# Patient Record
Sex: FEMALE | Race: OTHER | HISPANIC OR LATINO | URBAN - METROPOLITAN AREA
[De-identification: names, ages, dates, MRNs, and addresses within clinical notes are randomized per-mention and may not be internally consistent; named-entity substitution may affect disease eponyms.]

---

## 2021-04-13 ENCOUNTER — INPATIENT (INPATIENT)
Facility: HOSPITAL | Age: 66
LOS: 5 days | Discharge: HOME | DRG: 871 | End: 2021-04-19
Attending: INTERNAL MEDICINE | Admitting: INTERNAL MEDICINE
Payer: COMMERCIAL

## 2021-04-13 VITALS
SYSTOLIC BLOOD PRESSURE: 153 MMHG | TEMPERATURE: 100 F | DIASTOLIC BLOOD PRESSURE: 78 MMHG | OXYGEN SATURATION: 97 % | HEART RATE: 140 BPM | HEIGHT: 69 IN | RESPIRATION RATE: 22 BRPM | WEIGHT: 293 LBS

## 2021-04-13 DIAGNOSIS — Z90.49 ACQUIRED ABSENCE OF OTHER SPECIFIED PARTS OF DIGESTIVE TRACT: Chronic | ICD-10-CM

## 2021-04-13 LAB
ALBUMIN SERPL ELPH-MCNC: 2.6 G/DL — LOW (ref 3.5–5)
ALP SERPL-CCNC: 163 U/L — HIGH (ref 40–120)
ALT FLD-CCNC: 20 U/L DA — SIGNIFICANT CHANGE UP (ref 10–60)
ANION GAP SERPL CALC-SCNC: 10 MMOL/L — SIGNIFICANT CHANGE UP (ref 5–17)
AST SERPL-CCNC: 34 U/L — SIGNIFICANT CHANGE UP (ref 10–40)
BASOPHILS # BLD AUTO: 0 K/UL — SIGNIFICANT CHANGE UP (ref 0–0.2)
BASOPHILS NFR BLD AUTO: 0 % — SIGNIFICANT CHANGE UP (ref 0–2)
BILIRUB SERPL-MCNC: 0.9 MG/DL — SIGNIFICANT CHANGE UP (ref 0.2–1.2)
BUN SERPL-MCNC: 33 MG/DL — HIGH (ref 7–18)
CALCIUM SERPL-MCNC: 8.4 MG/DL — SIGNIFICANT CHANGE UP (ref 8.4–10.5)
CHLORIDE SERPL-SCNC: 102 MMOL/L — SIGNIFICANT CHANGE UP (ref 96–108)
CO2 SERPL-SCNC: 24 MMOL/L — SIGNIFICANT CHANGE UP (ref 22–31)
CREAT SERPL-MCNC: 2.7 MG/DL — HIGH (ref 0.5–1.3)
D DIMER BLD IA.RAPID-MCNC: 5605 NG/ML DDU — HIGH
EOSINOPHIL # BLD AUTO: 0 K/UL — SIGNIFICANT CHANGE UP (ref 0–0.5)
EOSINOPHIL NFR BLD AUTO: 0 % — SIGNIFICANT CHANGE UP (ref 0–6)
GLUCOSE SERPL-MCNC: 152 MG/DL — HIGH (ref 70–99)
HCT VFR BLD CALC: 37 % — SIGNIFICANT CHANGE UP (ref 34.5–45)
HGB BLD-MCNC: 11.8 G/DL — SIGNIFICANT CHANGE UP (ref 11.5–15.5)
HIV 1 & 2 AB SERPL IA.RAPID: SIGNIFICANT CHANGE UP
LACTATE SERPL-SCNC: 2.5 MMOL/L — HIGH (ref 0.7–2)
LYMPHOCYTES # BLD AUTO: 0.58 K/UL — LOW (ref 1–3.3)
LYMPHOCYTES # BLD AUTO: 5 % — LOW (ref 13–44)
MCHC RBC-ENTMCNC: 29.1 PG — SIGNIFICANT CHANGE UP (ref 27–34)
MCHC RBC-ENTMCNC: 31.9 GM/DL — LOW (ref 32–36)
MCV RBC AUTO: 91.4 FL — SIGNIFICANT CHANGE UP (ref 80–100)
MONOCYTES # BLD AUTO: 0.12 K/UL — SIGNIFICANT CHANGE UP (ref 0–0.9)
MONOCYTES NFR BLD AUTO: 1 % — LOW (ref 2–14)
NEUTROPHILS # BLD AUTO: 10.75 K/UL — HIGH (ref 1.8–7.4)
NEUTROPHILS NFR BLD AUTO: 79 % — HIGH (ref 43–77)
NT-PROBNP SERPL-SCNC: 576 PG/ML — HIGH (ref 0–125)
PLATELET # BLD AUTO: 196 K/UL — SIGNIFICANT CHANGE UP (ref 150–400)
POTASSIUM SERPL-MCNC: 3.2 MMOL/L — LOW (ref 3.5–5.3)
POTASSIUM SERPL-SCNC: 3.2 MMOL/L — LOW (ref 3.5–5.3)
PROT SERPL-MCNC: 7.5 G/DL — SIGNIFICANT CHANGE UP (ref 6–8.3)
RBC # BLD: 4.05 M/UL — SIGNIFICANT CHANGE UP (ref 3.8–5.2)
RBC # FLD: 14.6 % — HIGH (ref 10.3–14.5)
SODIUM SERPL-SCNC: 136 MMOL/L — SIGNIFICANT CHANGE UP (ref 135–145)
TROPONIN I SERPL-MCNC: 1.97 NG/ML — HIGH (ref 0–0.04)
WBC # BLD: 11.56 K/UL — HIGH (ref 3.8–10.5)
WBC # FLD AUTO: 11.56 K/UL — HIGH (ref 3.8–10.5)

## 2021-04-13 PROCEDURE — 71045 X-RAY EXAM CHEST 1 VIEW: CPT | Mod: 26

## 2021-04-13 PROCEDURE — 99291 CRITICAL CARE FIRST HOUR: CPT

## 2021-04-13 RX ORDER — ACETAMINOPHEN 500 MG
650 TABLET ORAL ONCE
Refills: 0 | Status: COMPLETED | OUTPATIENT
Start: 2021-04-13 | End: 2021-04-13

## 2021-04-13 RX ORDER — SODIUM CHLORIDE 9 MG/ML
1000 INJECTION INTRAMUSCULAR; INTRAVENOUS; SUBCUTANEOUS ONCE
Refills: 0 | Status: COMPLETED | OUTPATIENT
Start: 2021-04-13 | End: 2021-04-13

## 2021-04-13 RX ORDER — CEFTRIAXONE 500 MG/1
1000 INJECTION, POWDER, FOR SOLUTION INTRAMUSCULAR; INTRAVENOUS ONCE
Refills: 0 | Status: COMPLETED | OUTPATIENT
Start: 2021-04-13 | End: 2021-04-13

## 2021-04-13 RX ADMIN — CEFTRIAXONE 100 MILLIGRAM(S): 500 INJECTION, POWDER, FOR SOLUTION INTRAMUSCULAR; INTRAVENOUS at 23:22

## 2021-04-13 RX ADMIN — Medication 650 MILLIGRAM(S): at 23:22

## 2021-04-13 RX ADMIN — SODIUM CHLORIDE 3000 MILLILITER(S): 9 INJECTION INTRAMUSCULAR; INTRAVENOUS; SUBCUTANEOUS at 23:22

## 2021-04-13 RX ADMIN — SODIUM CHLORIDE 2100 MILLILITER(S): 9 INJECTION INTRAMUSCULAR; INTRAVENOUS; SUBCUTANEOUS at 23:05

## 2021-04-13 NOTE — ED PROVIDER NOTE - CARE PLAN
Principal Discharge DX:	Renal failure  Secondary Diagnosis:	Tachycardia  Secondary Diagnosis:	Troponin level elevated  Secondary Diagnosis:	D-dimer, elevated

## 2021-04-13 NOTE — ED PROVIDER NOTE - OBJECTIVE STATEMENT
Chief complaint of chills x 4 days. On evaluation pt denies chest pain, shortness of breath, no reported orthopnea, denies fever, no vomiting, no abdominal pain.  Triage reported pox 91% on RA, Ox 97% on 4L NC.  Pt with temp of 99.9, , code sepsis called at 1030pm. blood cultures, lactate and early antibiotics ordered.   30cc/kg bolus held due to possible COVID viral cause of sepsis.

## 2021-04-13 NOTE — ED PROVIDER NOTE - CLINICAL SUMMARY MEDICAL DECISION MAKING FREE TEXT BOX
Pt with elevated d-dimer , will heparinize for possible PE, unable to do CTA at this time due to renal failure.  MAR and Dr. Pike endorsed pt admitted to telemetry for further monitoring.   Bandemia noted will administer Vancomycin.  Pt agrees with admission.  I had a detailed discussion with the patient and/or guardian regarding the historical points, exam findings, and any diagnostic results supporting the admit diagnosis.

## 2021-04-14 DIAGNOSIS — R09.89 OTHER SPECIFIED SYMPTOMS AND SIGNS INVOLVING THE CIRCULATORY AND RESPIRATORY SYSTEMS: ICD-10-CM

## 2021-04-14 DIAGNOSIS — E87.6 HYPOKALEMIA: ICD-10-CM

## 2021-04-14 DIAGNOSIS — R77.8 OTHER SPECIFIED ABNORMALITIES OF PLASMA PROTEINS: ICD-10-CM

## 2021-04-14 DIAGNOSIS — I10 ESSENTIAL (PRIMARY) HYPERTENSION: ICD-10-CM

## 2021-04-14 DIAGNOSIS — N39.0 URINARY TRACT INFECTION, SITE NOT SPECIFIED: ICD-10-CM

## 2021-04-14 DIAGNOSIS — A41.9 SEPSIS, UNSPECIFIED ORGANISM: ICD-10-CM

## 2021-04-14 DIAGNOSIS — R74.8 ABNORMAL LEVELS OF OTHER SERUM ENZYMES: ICD-10-CM

## 2021-04-14 DIAGNOSIS — N19 UNSPECIFIED KIDNEY FAILURE: ICD-10-CM

## 2021-04-14 DIAGNOSIS — N17.9 ACUTE KIDNEY FAILURE, UNSPECIFIED: ICD-10-CM

## 2021-04-14 DIAGNOSIS — Z29.9 ENCOUNTER FOR PROPHYLACTIC MEASURES, UNSPECIFIED: ICD-10-CM

## 2021-04-14 LAB
24R-OH-CALCIDIOL SERPL-MCNC: 27.6 NG/ML — LOW (ref 30–80)
A1C WITH ESTIMATED AVERAGE GLUCOSE RESULT: 6.5 % — HIGH (ref 4–5.6)
ALBUMIN SERPL ELPH-MCNC: 2.5 G/DL — LOW (ref 3.5–5)
ALP SERPL-CCNC: 119 U/L — SIGNIFICANT CHANGE UP (ref 40–120)
ALT FLD-CCNC: 23 U/L DA — SIGNIFICANT CHANGE UP (ref 10–60)
ANION GAP SERPL CALC-SCNC: 9 MMOL/L — SIGNIFICANT CHANGE UP (ref 5–17)
APPEARANCE UR: ABNORMAL
APTT BLD: 76.6 SEC — HIGH (ref 27.5–35.5)
APTT BLD: 76.6 SEC — HIGH (ref 27.5–35.5)
APTT BLD: 85.2 SEC — HIGH (ref 27.5–35.5)
AST SERPL-CCNC: 49 U/L — HIGH (ref 10–40)
BASOPHILS # BLD AUTO: 0.1 K/UL — SIGNIFICANT CHANGE UP (ref 0–0.2)
BASOPHILS NFR BLD AUTO: 0.4 % — SIGNIFICANT CHANGE UP (ref 0–2)
BILIRUB SERPL-MCNC: 0.8 MG/DL — SIGNIFICANT CHANGE UP (ref 0.2–1.2)
BILIRUB UR-MCNC: NEGATIVE — SIGNIFICANT CHANGE UP
BUN SERPL-MCNC: 33 MG/DL — HIGH (ref 7–18)
CALCIUM SERPL-MCNC: 8.3 MG/DL — LOW (ref 8.4–10.5)
CHLORIDE SERPL-SCNC: 103 MMOL/L — SIGNIFICANT CHANGE UP (ref 96–108)
CHOLEST SERPL-MCNC: 152 MG/DL — SIGNIFICANT CHANGE UP
CO2 SERPL-SCNC: 25 MMOL/L — SIGNIFICANT CHANGE UP (ref 22–31)
COLOR SPEC: ABNORMAL
CREAT ?TM UR-MCNC: 301 MG/DL — SIGNIFICANT CHANGE UP
CREAT SERPL-MCNC: 2.83 MG/DL — HIGH (ref 0.5–1.3)
CRP SERPL-MCNC: 233 MG/L — HIGH
DIFF PNL FLD: ABNORMAL
EOSINOPHIL # BLD AUTO: 0.13 K/UL — SIGNIFICANT CHANGE UP (ref 0–0.5)
EOSINOPHIL NFR BLD AUTO: 0.5 % — SIGNIFICANT CHANGE UP (ref 0–6)
ERYTHROCYTE [SEDIMENTATION RATE] IN BLOOD: 70 MM/HR — HIGH (ref 0–20)
ESTIMATED AVERAGE GLUCOSE: 140 MG/DL — HIGH (ref 68–114)
FERRITIN SERPL-MCNC: 196 NG/ML — HIGH (ref 15–150)
FERRITIN SERPL-MCNC: 246 NG/ML — HIGH (ref 15–150)
GGT SERPL-CCNC: 106 U/L — HIGH (ref 8–40)
GLUCOSE SERPL-MCNC: 199 MG/DL — HIGH (ref 70–99)
GLUCOSE UR QL: NEGATIVE — SIGNIFICANT CHANGE UP
GRAM STN FLD: SIGNIFICANT CHANGE UP
HCT VFR BLD CALC: 35.3 % — SIGNIFICANT CHANGE UP (ref 34.5–45)
HCT VFR BLD CALC: 35.8 % — SIGNIFICANT CHANGE UP (ref 34.5–45)
HCT VFR BLD CALC: 36.4 % — SIGNIFICANT CHANGE UP (ref 34.5–45)
HDLC SERPL-MCNC: 48 MG/DL — LOW
HGB BLD-MCNC: 11.1 G/DL — LOW (ref 11.5–15.5)
HGB BLD-MCNC: 11.4 G/DL — LOW (ref 11.5–15.5)
HGB BLD-MCNC: 11.5 G/DL — SIGNIFICANT CHANGE UP (ref 11.5–15.5)
IMM GRANULOCYTES NFR BLD AUTO: 2.3 % — HIGH (ref 0–1.5)
INR BLD: 1.27 RATIO — HIGH (ref 0.88–1.16)
KETONES UR-MCNC: ABNORMAL
LACTATE SERPL-SCNC: 2.2 MMOL/L — HIGH (ref 0.7–2)
LDH SERPL L TO P-CCNC: 213 U/L — SIGNIFICANT CHANGE UP (ref 120–225)
LEUKOCYTE ESTERASE UR-ACNC: ABNORMAL
LIPID PNL WITH DIRECT LDL SERPL: 81 MG/DL — SIGNIFICANT CHANGE UP
LYMPHOCYTES # BLD AUTO: 0.65 K/UL — LOW (ref 1–3.3)
LYMPHOCYTES # BLD AUTO: 2.4 % — LOW (ref 13–44)
MAGNESIUM SERPL-MCNC: 1.8 MG/DL — SIGNIFICANT CHANGE UP (ref 1.6–2.6)
MCHC RBC-ENTMCNC: 29.1 PG — SIGNIFICANT CHANGE UP (ref 27–34)
MCHC RBC-ENTMCNC: 29.4 PG — SIGNIFICANT CHANGE UP (ref 27–34)
MCHC RBC-ENTMCNC: 29.8 PG — SIGNIFICANT CHANGE UP (ref 27–34)
MCHC RBC-ENTMCNC: 31.3 GM/DL — LOW (ref 32–36)
MCHC RBC-ENTMCNC: 31.4 GM/DL — LOW (ref 32–36)
MCHC RBC-ENTMCNC: 32.1 GM/DL — SIGNIFICANT CHANGE UP (ref 32–36)
MCV RBC AUTO: 92.4 FL — SIGNIFICANT CHANGE UP (ref 80–100)
MCV RBC AUTO: 92.7 FL — SIGNIFICANT CHANGE UP (ref 80–100)
MCV RBC AUTO: 93.8 FL — SIGNIFICANT CHANGE UP (ref 80–100)
METHOD TYPE: SIGNIFICANT CHANGE UP
MONOCYTES # BLD AUTO: 1.13 K/UL — HIGH (ref 0–0.9)
MONOCYTES NFR BLD AUTO: 4.2 % — SIGNIFICANT CHANGE UP (ref 2–14)
NEUTROPHILS # BLD AUTO: 24.08 K/UL — HIGH (ref 1.8–7.4)
NEUTROPHILS NFR BLD AUTO: 90.2 % — HIGH (ref 43–77)
NITRITE UR-MCNC: POSITIVE
NON HDL CHOLESTEROL: 104 MG/DL — SIGNIFICANT CHANGE UP
NRBC # BLD: 0 /100 WBCS — SIGNIFICANT CHANGE UP (ref 0–0)
OSMOLALITY UR: 481 MOS/KG — SIGNIFICANT CHANGE UP (ref 50–1200)
P MIRABILIS DNA BLD POS QL NAA+PROBE: SIGNIFICANT CHANGE UP
PH UR: 9 — HIGH (ref 5–8)
PHOSPHATE SERPL-MCNC: 3.5 MG/DL — SIGNIFICANT CHANGE UP (ref 2.5–4.5)
PLATELET # BLD AUTO: 182 K/UL — SIGNIFICANT CHANGE UP (ref 150–400)
PLATELET # BLD AUTO: 193 K/UL — SIGNIFICANT CHANGE UP (ref 150–400)
PLATELET # BLD AUTO: 201 K/UL — SIGNIFICANT CHANGE UP (ref 150–400)
POTASSIUM SERPL-MCNC: 3.7 MMOL/L — SIGNIFICANT CHANGE UP (ref 3.5–5.3)
POTASSIUM SERPL-SCNC: 3.7 MMOL/L — SIGNIFICANT CHANGE UP (ref 3.5–5.3)
PROCALCITONIN SERPL-MCNC: 29.3 NG/ML — HIGH (ref 0.02–0.1)
PROT ?TM UR-MCNC: 598 MG/DL — HIGH (ref 0–12)
PROT SERPL-MCNC: 7.5 G/DL — SIGNIFICANT CHANGE UP (ref 6–8.3)
PROT UR-MCNC: 500 MG/DL
PROTHROM AB SERPL-ACNC: 14.9 SEC — HIGH (ref 10.6–13.6)
RAPID RVP RESULT: SIGNIFICANT CHANGE UP
RBC # BLD: 3.82 M/UL — SIGNIFICANT CHANGE UP (ref 3.8–5.2)
RBC # BLD: 3.86 M/UL — SIGNIFICANT CHANGE UP (ref 3.8–5.2)
RBC # BLD: 3.88 M/UL — SIGNIFICANT CHANGE UP (ref 3.8–5.2)
RBC # FLD: 14.7 % — HIGH (ref 10.3–14.5)
RBC # FLD: 14.7 % — HIGH (ref 10.3–14.5)
RBC # FLD: 14.9 % — HIGH (ref 10.3–14.5)
SARS-COV-2 RNA SPEC QL NAA+PROBE: SIGNIFICANT CHANGE UP
SARS-COV-2 RNA SPEC QL NAA+PROBE: SIGNIFICANT CHANGE UP
SODIUM SERPL-SCNC: 137 MMOL/L — SIGNIFICANT CHANGE UP (ref 135–145)
SODIUM UR-SCNC: 35 MMOL/L — SIGNIFICANT CHANGE UP
SP GR SPEC: 1.01 — SIGNIFICANT CHANGE UP (ref 1.01–1.02)
SPECIMEN SOURCE: SIGNIFICANT CHANGE UP
SPECIMEN SOURCE: SIGNIFICANT CHANGE UP
TRIGL SERPL-MCNC: 114 MG/DL — SIGNIFICANT CHANGE UP
TROPONIN I SERPL-MCNC: 3.13 NG/ML — HIGH (ref 0–0.04)
TROPONIN I SERPL-MCNC: 3.19 NG/ML — HIGH (ref 0–0.04)
TROPONIN I SERPL-MCNC: 4.3 NG/ML — HIGH (ref 0–0.04)
TSH SERPL-MCNC: 0.96 UU/ML — SIGNIFICANT CHANGE UP (ref 0.34–4.82)
UROBILINOGEN FLD QL: 1
VIT B12 SERPL-MCNC: 339 PG/ML — SIGNIFICANT CHANGE UP (ref 232–1245)
WBC # BLD: 17.41 K/UL — HIGH (ref 3.8–10.5)
WBC # BLD: 23.08 K/UL — HIGH (ref 3.8–10.5)
WBC # BLD: 26.71 K/UL — HIGH (ref 3.8–10.5)
WBC # FLD AUTO: 17.41 K/UL — HIGH (ref 3.8–10.5)
WBC # FLD AUTO: 23.08 K/UL — HIGH (ref 3.8–10.5)
WBC # FLD AUTO: 26.71 K/UL — HIGH (ref 3.8–10.5)

## 2021-04-14 PROCEDURE — 99223 1ST HOSP IP/OBS HIGH 75: CPT

## 2021-04-14 RX ORDER — CEFTRIAXONE 500 MG/1
1000 INJECTION, POWDER, FOR SOLUTION INTRAMUSCULAR; INTRAVENOUS EVERY 24 HOURS
Refills: 0 | Status: DISCONTINUED | OUTPATIENT
Start: 2021-04-14 | End: 2021-04-19

## 2021-04-14 RX ORDER — VANCOMYCIN HCL 1 G
1000 VIAL (EA) INTRAVENOUS ONCE
Refills: 0 | Status: COMPLETED | OUTPATIENT
Start: 2021-04-14 | End: 2021-04-14

## 2021-04-14 RX ORDER — HEPARIN SODIUM 5000 [USP'U]/ML
5000 INJECTION INTRAVENOUS; SUBCUTANEOUS EVERY 6 HOURS
Refills: 0 | Status: DISCONTINUED | OUTPATIENT
Start: 2021-04-14 | End: 2021-04-18

## 2021-04-14 RX ORDER — HEPARIN SODIUM 5000 [USP'U]/ML
10000 INJECTION INTRAVENOUS; SUBCUTANEOUS EVERY 6 HOURS
Refills: 0 | Status: DISCONTINUED | OUTPATIENT
Start: 2021-04-14 | End: 2021-04-18

## 2021-04-14 RX ORDER — SODIUM CHLORIDE 9 MG/ML
2100 INJECTION INTRAMUSCULAR; INTRAVENOUS; SUBCUTANEOUS ONCE
Refills: 0 | Status: COMPLETED | OUTPATIENT
Start: 2021-04-14 | End: 2021-04-13

## 2021-04-14 RX ORDER — ACETAMINOPHEN 500 MG
975 TABLET ORAL ONCE
Refills: 0 | Status: COMPLETED | OUTPATIENT
Start: 2021-04-14 | End: 2021-04-14

## 2021-04-14 RX ORDER — HEPARIN SODIUM 5000 [USP'U]/ML
10000 INJECTION INTRAVENOUS; SUBCUTANEOUS ONCE
Refills: 0 | Status: COMPLETED | OUTPATIENT
Start: 2021-04-14 | End: 2021-04-14

## 2021-04-14 RX ORDER — SODIUM CHLORIDE 9 MG/ML
1000 INJECTION INTRAMUSCULAR; INTRAVENOUS; SUBCUTANEOUS ONCE
Refills: 0 | Status: COMPLETED | OUTPATIENT
Start: 2021-04-14 | End: 2021-04-14

## 2021-04-14 RX ORDER — SODIUM CHLORIDE 9 MG/ML
1000 INJECTION, SOLUTION INTRAVENOUS
Refills: 0 | Status: DISCONTINUED | OUTPATIENT
Start: 2021-04-14 | End: 2021-04-19

## 2021-04-14 RX ORDER — POTASSIUM CHLORIDE 20 MEQ
40 PACKET (EA) ORAL ONCE
Refills: 0 | Status: COMPLETED | OUTPATIENT
Start: 2021-04-14 | End: 2021-04-14

## 2021-04-14 RX ORDER — HEPARIN SODIUM 5000 [USP'U]/ML
INJECTION INTRAVENOUS; SUBCUTANEOUS
Qty: 25000 | Refills: 0 | Status: DISCONTINUED | OUTPATIENT
Start: 2021-04-14 | End: 2021-04-18

## 2021-04-14 RX ADMIN — Medication 650 MILLIGRAM(S): at 00:00

## 2021-04-14 RX ADMIN — Medication 250 MILLIGRAM(S): at 03:44

## 2021-04-14 RX ADMIN — Medication 975 MILLIGRAM(S): at 14:32

## 2021-04-14 RX ADMIN — CEFTRIAXONE 100 MILLIGRAM(S): 500 INJECTION, POWDER, FOR SOLUTION INTRAMUSCULAR; INTRAVENOUS at 23:25

## 2021-04-14 RX ADMIN — Medication 40 MILLIEQUIVALENT(S): at 03:00

## 2021-04-14 RX ADMIN — SODIUM CHLORIDE 1000 MILLILITER(S): 9 INJECTION INTRAMUSCULAR; INTRAVENOUS; SUBCUTANEOUS at 05:13

## 2021-04-14 RX ADMIN — HEPARIN SODIUM 10000 UNIT(S): 5000 INJECTION INTRAVENOUS; SUBCUTANEOUS at 03:00

## 2021-04-14 RX ADMIN — SODIUM CHLORIDE 125 MILLILITER(S): 9 INJECTION, SOLUTION INTRAVENOUS at 18:33

## 2021-04-14 RX ADMIN — HEPARIN SODIUM 2400 UNIT(S)/HR: 5000 INJECTION INTRAVENOUS; SUBCUTANEOUS at 16:00

## 2021-04-14 RX ADMIN — HEPARIN SODIUM 2400 UNIT(S)/HR: 5000 INJECTION INTRAVENOUS; SUBCUTANEOUS at 03:01

## 2021-04-14 RX ADMIN — Medication 975 MILLIGRAM(S): at 15:02

## 2021-04-14 NOTE — H&P ADULT - PROBLEM SELECTOR PLAN 2
p/w spO2 91% on RA, tachy 140s, D-dimer 5600  cannot get CTA chest due to RODRI (also pt doesn't look like fitting in CT machine)  on heparin drip  f/u VQ scan if pt can fit in the machine  f/u doppler LE to r/o DVt

## 2021-04-14 NOTE — CONSULT NOTE ADULT - SUBJECTIVE AND OBJECTIVE BOX
[facility] NEPHROLOGY- CONSULTATION NOTE    Patient is a 66yo Morbidly obese Female with HLD s/p cholecystectomy p/w chills and SOB. Nephrology consulted for Elevated serum creatinine.    Pt has not seen a physician in more than 3 years. She states he has HLD only and not HTN. She was having chills and Rt buttock pain radiating to the front on ; pain resolved. Pt then had rigors on  and  and during those episodes had SOB.   She denies any previous h/o kidney disease. Patient denies any NSAID use, recent CT with contrast, hepatitis or blood transfusions. Pt c/o urinary incontinence/ urge incontinence. She denies any dysuria, hematuria or foamy urine.   Pt denies any fevers, current SOB, chest pain, n/v/d, or abd pain. Pt with chronic LE edema. She denies lack of appetite. Pt has dry skin over her LE and was putting neosporin and picking at her scabs.       PAST MEDICAL & SURGICAL HISTORY:  HTN (hypertension)    S/P cholecystectomy      No Known Allergies    Home Medications Reviewed  Hospital Medications:   MEDICATIONS  (STANDING):  cefTRIAXone   IVPB 1000 milliGRAM(s) IV Intermittent every 24 hours  heparin  Infusion.  Unit(s)/Hr (24 mL/Hr) IV Continuous <Continuous>  lactated ringers. 1000 milliLiter(s) (125 mL/Hr) IV Continuous <Continuous>    SOCIAL HISTORY:  Denies ETOh, Smoking, or drug use  FAMILY HISTORY:  FH: heart disease (Mother, Sibling)    FH: Parkinson&#x27;s disease (Mother)        REVIEW OF SYSTEMS:  Gen: no changes in weight +chills   HEENT: no rhinorrhea  Neck: no sore throat  Cards: no chest pain  Resp: no dyspnea, only dyspnea when having rigors  GI: no nausea or vomiting or diarrhea  : no dysuria or hematuria +incontinence  Vascular: +chronic LE edema  Derm: +scabs on LE   Neuro: no numbness/tingling  All other review of systems is negative unless indicated above.    VITALS:  T(F): 99.3 (21 @ 17:35), Max: 99.9 (21 @ 21:34)  HR: 94 (21 @ 17:35)  BP: 116/70 (21 @ 17:35)  RR: 17 (21 @ 17:35)  SpO2: 96% (21 @ 17:35)  Wt(kg): --    Height (cm): 175.3 ( @ 21:34)  Weight (kg): 169.2 ( @ 21:34)  BMI (kg/m2): 55.1 ( @ 21:34)  BSA (m2): 2.69 ( @ 21:34)    PHYSICAL EXAM:  Gen: NAD, morbidly obese  HEENT: anicteric  Neck: no JVD  Cards: RRR, +S1/S2, no M/G/R  Resp: CTA B/L  GI: soft, NT/ND, +abd hernia  : no CVA tenderness  Extremities: +1 LE edema B/L  Derm: small excoriation scabs  Neuro: non-focal    LABS:      137  |  103  |  33<H>  ----------------------------<  199<H>  3.7   |  25  |  2.83<H>    Ca    8.3<L>      2021 05:37  Phos  3.5       Mg     1.8         TPro  7.5  /  Alb  2.5<L>  /  TBili  0.8  /  DBili      /  AST  49<H>  /  ALT  23  /  AlkPhos  119      Creatinine Trend: 2.83 <--, 2.70 <--                        11.1   17.41 )-----------( 182      ( 2021 16:21 )             35.3     Urine Studies:  Urinalysis Basic - ( 2021 03:33 )    Color: Red / Appearance: Turbid / S.015 / pH:   Gluc:  / Ketone: Trace  / Bili: Negative / Urobili: 1   Blood:  / Protein: 500 mg/dL / Nitrite: Positive   Leuk Esterase: Moderate / RBC: >50 /HPF / WBC 26-50 /HPF   Sq Epi:  / Non Sq Epi: Few /HPF / Bacteria: Many /HPF      Sodium, Random Urine: 35 mmol/L ( @ 03:32)  Creatinine, Random Urine: 301 mg/dL ( @ 03:32)  Osmolality, Random Urine: 481 mos/kg ( @ 03:32)    RADIOLOGY & ADDITIONAL STUDIES:      < from: Xray Chest 1 View-PORTABLE IMMEDIATE (21 @ 22:51) >    EXAM:  XR CHEST PORTABLE IMMED 1V                            PROCEDURE DATE:  2021          INTERPRETATION:  AP chest on 2021 at 10:40 PM. Patient is short of breath with cough and fever.    COMPARISON: None available.    Heart is magnified by technique.    No lung or pleural finding is evident.    There is degeneration of the upper medial left shoulder.    IMPRESSION: Chest unremarkable. Degeneration upper medial left shoulder.      < end of copied text >

## 2021-04-14 NOTE — ED ADULT NURSE REASSESSMENT NOTE - NS ED NURSE REASSESS COMMENT FT1
pt resting comfortably with heparin therapy in progress, b/p 92/60 normal saline infusing as ordered close monitoring continues.

## 2021-04-14 NOTE — H&P ADULT - PROBLEM SELECTOR PLAN 9
IMPROVE VTE Individual Risk Assessment  RISK                                                                Points  [  ] Previous VTE                                                  3  [  ] Thrombophilia                                               2  [  ] Lower limb paralysis                                      2        (unable to hold up >15 seconds)    [  ] Current Cancer                                              2         (within 6 months)  [x  ] Immobilization > 24 hrs                                1  [  ] ICU/CCU stay > 24 hours                              1  [ x ] Age > 60                                                      1  IMPROVE VTE Score ___2______  Pt is on heparin drip, no need for GI ppx

## 2021-04-14 NOTE — CONSULT NOTE ADULT - TIME BILLING
- Review of records, telemetry, vital signs and daily labs.   - General and cardiovascular physical examination.  - Generation of cardiovascular treatment plan.  - Coordination of care.    Patient was seen and examined by me on 04/14/2021,interim events noted,labs and radiology studies reviewed.  Ronnie Mcdonald MD,FACC.  [address] [place], [place] [id number].  [phone]

## 2021-04-14 NOTE — H&P ADULT - PROBLEM SELECTOR PLAN 1
p/wtemp of 99.9,  , WBC count 11 K ,lactate 2.5 ,  /73   - Likely 2/2 UTI   - UA +ve, has urinary symptoms  -s/p vanco 1g, rocephin, 1L NS bolus    - will start on rocephin 1g q24   - will give 1 L bolus NS and maintainence Fluids@ 125 cc LR for 24 hrs    - f/u UCx and BCx. p/wtemp of 99.9,  , WBC count 11 K ,lactate 2.5 ,  /73   - Likely 2/2 UTI   - UA +ve, has urinary symptoms  -s/p vanco 1g, rocephin, 1L NS bolus    - will start on rocephin 1g q24   - will give 1 L bolus NS and maintainence Fluids@ 125 cc LR for 24 hrs    - f/u UCx and BCx.  ID Dr. Long was consulted

## 2021-04-14 NOTE — H&P ADULT - PROBLEM SELECTOR PLAN 4
p/w trop 1.970  No chest pain, EKG no ischemic change  likely from PE  f/u T2 in AM   f/u TTE p/w trop 1.970 ->4.30  No chest pain, EKG no ischemic change  likely due to stress from PE  f/u TTE  Cardio Dr. Adame consulted

## 2021-04-14 NOTE — H&P ADULT - NSICDXFAMILYHX_GEN_ALL_CORE_FT
FAMILY HISTORY:  Mother  Still living? Unknown  FH: heart disease, Age at diagnosis: Age Unknown  FH: Parkinson's disease, Age at diagnosis: Age Unknown    Sibling  Still living? Unknown  FH: heart disease, Age at diagnosis: Age Unknown

## 2021-04-14 NOTE — H&P ADULT - ASSESSMENT
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 Pt states she has intermittent chills, urinary urgency and incontinent for 4 days. Pt was admitted to [facility] for sepsis likely 2/2/ UTI and suspected PE.      in the ED;  Triage reported pox 91% on RA, now Ox 98% on 3L NC.  temp of 99.9, , code sepsis called. Initially 30cc/kg bolus held due to possible COVID viral cause of sepsis.  D-dimer 5600, heparin drip was started. cannot take CTA due to RODRI

## 2021-04-14 NOTE — CHART NOTE - NSCHARTNOTEFT_GEN_A_CORE
EVENT note    OBJECTIVE:  Vital Signs Last 24 Hrs  T(C): 36.6 (14 Apr 2021 12:17), Max: 37.7 (13 Apr 2021 21:34)  T(F): 97.9 (14 Apr 2021 12:17), Max: 99.9 (13 Apr 2021 21:34)  HR: 77 (14 Apr 2021 12:17) (77 - 140)  BP: 97/62 (14 Apr 2021 12:17) (92/60 - 153/78)  BP(mean): --  RR: 17 (14 Apr 2021 12:17) (16 - 22)  SpO2: 99% (14 Apr 2021 12:17) (96% - 99%)    LABS:                        11.5   23.08 )-----------( 201      ( 14 Apr 2021 09:43 )             35.8   CARDIAC MARKERS ( 14 Apr 2021 11:48 )  3.190 ng/mL / x     / x     / x     / x      CARDIAC MARKERS ( 14 Apr 2021 05:37 )  4.300 ng/mL / x     / x     / x     / x      CARDIAC MARKERS ( 13 Apr 2021 23:06 )  1.970 ng/mL / x     / x     / x     / x        04-14    137  |  103  |  33<H>  ----------------------------<  199<H>  3.7   |  25  |  2.83<H>    Ca    8.3<L>      14 Apr 2021 05:37  Phos  3.5     04-14  Mg     1.8     04-14    TPro  7.5  /  Alb  2.5<L>  /  TBili  0.8  /  DBili  x   /  AST  49<H>  /  ALT  23  /  AlkPhos  119  04-14      EKG:   IMGAGING:  < from: Xray Chest 1 View-PORTABLE IMMEDIATE (04.13.21 @ 22:51) >    EXAM:  XR CHEST PORTABLE IMMED 1V                            PROCEDURE DATE:  04/13/2021          INTERPRETATION:  AP chest on April 13, 2021 at 10:40 PM. Patient is short of breath with cough and fever.    COMPARISON: None available.    Heart is magnified by technique.    No lung or pleural finding is evident.    There is degeneration of the upper medial left shoulder.    IMPRESSION: Chest unremarkable. Degeneration upper medial left shoulder.    < end of copied text >      ASSESSMENT:  HPI:  Patient seen and examined at bedside, no new complaint    PHYSICAL EXAM:  GENERAL: NAD  HEENT: Normocephalic;  conjunctivae and sclerae clear; moist mucous membranes;   NECK : supple  CHEST/LUNG: Clear to auscultation bilaterally with good air entry   HEART: S1 S2  regular; no murmurs, gallops or rubs  ABDOMEN: Soft, Nontender, Nondistended; Bowel sounds present  EXTREMITIES: B/L lower leg extremities discoloration, no cyanosis; no edema; no calf tenderness  SKIN: warm and dry; no rash  NERVOUS SYSTEM:  Awake and alert; Oriented  to place, person and time ; no new deficits    PLAN:   PLAN:   -c/w telemetry  -c/w heparin drip-elevated d-dimer  -c/w rocephin- UA positive  -f/u ECHO, ECHO department aware  -f/u lower extremities doppler for r/o DVT  -f/u troponin-1.9->4.3->3.9  -f/u d-dimer in AM  -f/u creatinine, new RODRI or RODRI on CKD, unknown baseline,  no nephrology consult until creatinine trend monitoring discussed with medical attending, FENA 0.2% prerenal  -COVID PCR in AM-if negative can get off isolation discussed with infection controlLizabeth EVENT note    OBJECTIVE:  Vital Signs Last 24 Hrs  T(C): 36.6 (14 Apr 2021 12:17), Max: 37.7 (13 Apr 2021 21:34)  T(F): 97.9 (14 Apr 2021 12:17), Max: 99.9 (13 Apr 2021 21:34)  HR: 77 (14 Apr 2021 12:17) (77 - 140)  BP: 97/62 (14 Apr 2021 12:17) (92/60 - 153/78)  BP(mean): --  RR: 17 (14 Apr 2021 12:17) (16 - 22)  SpO2: 99% (14 Apr 2021 12:17) (96% - 99%)    LABS:                        11.5   23.08 )-----------( 201      ( 14 Apr 2021 09:43 )             35.8   CARDIAC MARKERS ( 14 Apr 2021 11:48 )  3.190 ng/mL / x     / x     / x     / x      CARDIAC MARKERS ( 14 Apr 2021 05:37 )  4.300 ng/mL / x     / x     / x     / x      CARDIAC MARKERS ( 13 Apr 2021 23:06 )  1.970 ng/mL / x     / x     / x     / x        04-14    137  |  103  |  33<H>  ----------------------------<  199<H>  3.7   |  25  |  2.83<H>    Ca    8.3<L>      14 Apr 2021 05:37  Phos  3.5     04-14  Mg     1.8     04-14    TPro  7.5  /  Alb  2.5<L>  /  TBili  0.8  /  DBili  x   /  AST  49<H>  /  ALT  23  /  AlkPhos  119  04-14      EKG:   IMGAGING:  < from: Xray Chest 1 View-PORTABLE IMMEDIATE (04.13.21 @ 22:51) >    EXAM:  XR CHEST PORTABLE IMMED 1V                            PROCEDURE DATE:  04/13/2021          INTERPRETATION:  AP chest on April 13, 2021 at 10:40 PM. Patient is short of breath with cough and fever.    COMPARISON: None available.    Heart is magnified by technique.    No lung or pleural finding is evident.    There is degeneration of the upper medial left shoulder.    IMPRESSION: Chest unremarkable. Degeneration upper medial left shoulder.    < end of copied text >      ASSESSMENT:  HPI:  Patient seen and examined at bedside, no new complaint    PHYSICAL EXAM:  GENERAL: NAD  HEENT: Normocephalic;  conjunctivae and sclerae clear; moist mucous membranes;   NECK : supple  CHEST/LUNG: Clear to auscultation bilaterally with good air entry   HEART: S1 S2  regular; no murmurs, gallops or rubs  ABDOMEN: Soft, Nontender, Nondistended; Bowel sounds present  EXTREMITIES: B/L lower leg extremities discoloration, no cyanosis; no edema; no calf tenderness  SKIN: warm and dry; no rash  NERVOUS SYSTEM:  Awake and alert; Oriented  to place, person and time ; no new deficits    PLAN:   PLAN:   -c/w telemetry  -c/w heparin drip-elevated d-dimer  -c/w rocephin- UA positive  -f/u urine and blood culture  -f/u ECHO, ECHO department aware  -f/u lower extremities doppler for r/o DVT  -f/u troponin-1.9->4.3->3.9  -f/u d-dimer in AM  -f/u creatinine, new RODRI or RODRI on CKD, unknown baseline,  no nephrology consult until creatinine trend monitoring discussed with medical attending, FENA 0.2% prerenal  -COVID PCR in AM-if negative can get off isolation discussed with infection controlLizabeth

## 2021-04-14 NOTE — H&P ADULT - ATTENDING COMMENTS
Pt seen and Pt seen and examained.  Case discussed with MAR.  65 year old woman with PMH of morbid obesity, hyperlipidemia not compliant with meds or clinic visit - not seen her PCP in > 2 years presents from home with 3 days of fevers with chills and severe rigors, urge incontinence, SOB with worsening exercise intolerance as well. She called the EMS because of the rigors but was noted to be hypoxic in the ED.    Vital Signs Last 24 Hrs  T(C): 37.7 (13 Apr 2021 23:34), Max: 37.7 (13 Apr 2021 21:34)  T(F): 99.9 (13 Apr 2021 23:34), Max: 99.9 (13 Apr 2021 21:34)  HR: 114 (13 Apr 2021 23:34) (114 - 140)  BP: 111/73 (13 Apr 2021 23:34) (111/73 - 153/78)  RR: 20 (13 Apr 2021 23:34) (20 - 22)  SpO2: 98% (13 Apr 2021 23:34) (97% - 98%)    Middle aged woman, obese, in acute resp distress with SaO2 low 90s in RA but improves to 96% on 4LPM via N/C.  AAO X 3  CTA B/L but limited due to body habitus, RRR S1S2 only   Full, obese pendulous abdomen, NT ND BS +  +++ pitting pedal edema to the legs   No focal deficits grossly    Labs                         11.8   11.56 )-----------( 196      ( 13 Apr 2021 23:06 )             37.0   with left shift    04-13    136  |  102  |  33<H>  ----------------------------<  152<H>  3.2<L>   |  24  |  2.70<H>    Ca    8.4      13 Apr 2021 23:06    TPro  7.5  /  Alb  2.6<L>  /  TBili  0.9  /  DBili  x   /  AST  34  /  ALT  20  /  AlkPhos  163<H>  04-13    D-dimer - 5605  Lactate - 2.5  Trop 1.97    SARS-CoV-2: NotDetec (13 Apr 2021 23:06)    CXR   Independent assessment  Widespread diffuse interstitial infiltrates worse in the Lower lobes    Impression  - Acute respiratory failure with hypoxia   - Suspected PE +/- DVT  - Elevated troponin - demand or reactive ischemia vs NSTEMI  - Sepsis with lactic acidosis  - Suspected UTI   - Hypokalemia  - RODRI +/- CKD  - Hyperglycemia - r/o DM2      - Plan   Admit to telemetry  Supplemental O2 to keep SaO2 > 96%  Full dose anticoagulation with Continuous infusion of unfractionated heparin   Aggressive IV fluid; Isotonic fluid @ 125cc/hour  Serial troponin; EKG ; if uptrending;consider full NSTEMI protocol, cardiology consult  ECHO  Sepsis work up; follow cultures  Empiric antibiotics with IV vancomycin 1g stat and then- renally dosed by random vanc levels. Cefepime renally dosed for gram negative coverage  DVT studies B/L lower extremities  A1c, lipid panel,   replace K+  repeat chem and lactate  ID consult Pt seen and examained.  Case discussed with MAR.  65 year old woman with PMH of morbid obesity, hyperlipidemia not compliant with meds or clinic visit - not seen her PCP in > 2 years presents from home with 3 days of fevers with chills and severe rigors, urge incontinence, SOB with worsening exercise intolerance as well. She called the EMS because of the rigors but was noted to be hypoxic in the ED.    Vital Signs Last 24 Hrs  T(C): 37.7 (13 Apr 2021 23:34), Max: 37.7 (13 Apr 2021 21:34)  T(F): 99.9 (13 Apr 2021 23:34), Max: 99.9 (13 Apr 2021 21:34)  HR: 114 (13 Apr 2021 23:34) (114 - 140)  BP: 111/73 (13 Apr 2021 23:34) (111/73 - 153/78)  RR: 20 (13 Apr 2021 23:34) (20 - 22)  SpO2: 98% (13 Apr 2021 23:34) (97% - 98%)    Middle aged woman, obese, in acute resp distress with SaO2 low 90s in RA but improves to 96% on 4LPM via N/C.  AAO X 3  CTA B/L but limited due to body habitus, RRR S1S2 only   Full, obese pendulous abdomen, NT ND BS +  +++ pitting pedal edema to the legs   No focal deficits grossly    Labs                         11.8   11.56 )-----------( 196      ( 13 Apr 2021 23:06 )             37.0   with left shift    04-13    136  |  102  |  33<H>  ----------------------------<  152<H>  3.2<L>   |  24  |  2.70<H>    Ca    8.4      13 Apr 2021 23:06    TPro  7.5  /  Alb  2.6<L>  /  TBili  0.9  /  DBili  x   /  AST  34  /  ALT  20  /  AlkPhos  163<H>  04-13    D-dimer - 5605  Lactate - 2.5  Trop 1.97    SARS-CoV-2: NotDetec (13 Apr 2021 23:06)    CXR   Independent assessment  Widespread diffuse interstitial infiltrates worse in the Lower lobes    Impression  - Acute respiratory failure with hypoxia   - Suspected PE +/- DVT  - Elevated troponin - demand or reactive ischemia vs NSTEMI  - Sepsis with lactic acidosis  - Suspected UTI   - Hypokalemia  - RODRI +/- CKD  - Hyperglycemia - r/o DM2      - Plan   Admit to telemetry  Supplemental O2 to keep SaO2 > 96%  Full dose anticoagulation with Continuous infusion of unfractionated heparin   Aggressive IV fluid; Isotonic fluid @ 125cc/hour  Serial troponin; EKG ; if uptrending; consider full NSTEMI protocol, cardiology consult  ECHO  Sepsis work up; follow cultures  Empiric antibiotics with IV vancomycin 1g stat and then- renally dosed by random vanc levels. Cefepime renally dosed for gram negative coverage  DVT studies B/L lower extremities  A1c, lipid panel,   replace K+  repeat chem and lactate  ID consult  Urine lytes, CTA chest if renal function improves, renal U/S Pt seen and examined.  Case discussed with MAR.  65 year old woman with PMH of morbid obesity, hyperlipidemia not compliant with meds or clinic visit - not seen her PCP in > 2 years presents from home with 3 days of fevers with chills and severe rigors, urge incontinence, SOB with worsening exercise intolerance as well. She called the EMS because of the rigors but was noted to be hypoxic in the ED.    Brother -Jonathan Meier     Vital Signs Last 24 Hrs  T(C): 37.7 (13 Apr 2021 23:34), Max: 37.7 (13 Apr 2021 21:34)  T(F): 99.9 (13 Apr 2021 23:34), Max: 99.9 (13 Apr 2021 21:34)  HR: 114 (13 Apr 2021 23:34) (114 - 140)  BP: 111/73 (13 Apr 2021 23:34) (111/73 - 153/78)  RR: 20 (13 Apr 2021 23:34) (20 - 22)  SpO2: 98% (13 Apr 2021 23:34) (97% - 98%)    Middle aged woman, obese, in acute resp distress with SaO2 low 90s in RA but improves to 96% on 4LPM via N/C.  AAO X 3  CTA B/L but limited due to body habitus, RRR S1S2 only   Full, obese pendulous abdomen, NT ND BS +  +++ pitting pedal edema to the legs   No focal deficits grossly    Labs                         11.8   11.56 )-----------( 196      ( 13 Apr 2021 23:06 )             37.0   with left shift    04-13    136  |  102  |  33<H>  ----------------------------<  152<H>  3.2<L>   |  24  |  2.70<H>    Ca    8.4      13 Apr 2021 23:06    TPro  7.5  /  Alb  2.6<L>  /  TBili  0.9  /  DBili  x   /  AST  34  /  ALT  20  /  AlkPhos  163<H>  04-13    D-dimer - 5605  Lactate - 2.5  Trop 1.97    SARS-CoV-2: NotDetec (13 Apr 2021 23:06)    CXR   Independent assessment  Widespread diffuse interstitial infiltrates worse in the Lower lobes    Impression  - Acute respiratory failure with hypoxia   - Suspected PE +/- DVT  - Elevated troponin - demand or reactive ischemia vs NSTEMI  - Sepsis with lactic acidosis  - Suspected UTI   - Hypokalemia  - RODRI +/- CKD  - Hyperglycemia - r/o DM2      - Plan   Admit to telemetry  Supplemental O2 to keep SaO2 > 96%  Full dose anticoagulation with Continuous infusion of unfractionated heparin   Aggressive IV fluid; Isotonic fluid @ 125cc/hour  Serial troponin; EKG ; if uptrending; consider full NSTEMI protocol, cardiology consult  ECHO  Sepsis work up; follow cultures  Empiric antibiotics with IV vancomycin 1g stat and then- renally dosed by random vanc levels. Cefepime renally dosed for gram negative coverage  DVT studies B/L lower extremities  A1c, lipid panel,   replace K+  repeat chem and lactate  ID consult  Urine lytes, CTA chest if renal function improves, renal U/S

## 2021-04-14 NOTE — PROGRESS NOTE ADULT - SUBJECTIVE AND OBJECTIVE BOX
EVENT: Shortness of breath possible to PE vs. NSTEMI      OBJECTIVE:  Vital Signs Last 24 Hrs  T(C): 36.6 (14 Apr 2021 12:17), Max: 37.7 (13 Apr 2021 21:34)  T(F): 97.9 (14 Apr 2021 12:17), Max: 99.9 (13 Apr 2021 21:34)  HR: 77 (14 Apr 2021 12:17) (77 - 140)  BP: 97/62 (14 Apr 2021 12:17) (92/60 - 153/78)  BP(mean): --  RR: 17 (14 Apr 2021 12:17) (16 - 22)  SpO2: 99% (14 Apr 2021 12:17) (96% - 99%)    LABS:                        11.5   23.08 )-----------( 201      ( 14 Apr 2021 09:43 )             35.8   CARDIAC MARKERS ( 14 Apr 2021 11:48 )  3.190 ng/mL / x     / x     / x     / x      CARDIAC MARKERS ( 14 Apr 2021 05:37 )  4.300 ng/mL / x     / x     / x     / x      CARDIAC MARKERS ( 13 Apr 2021 23:06 )  1.970 ng/mL / x     / x     / x     / x        04-14    137  |  103  |  33<H>  ----------------------------<  199<H>  3.7   |  25  |  2.83<H>    Ca    8.3<L>      14 Apr 2021 05:37  Phos  3.5     04-14  Mg     1.8     04-14    TPro  7.5  /  Alb  2.5<L>  /  TBili  0.8  /  DBili  x   /  AST  49<H>  /  ALT  23  /  AlkPhos  119  04-14      EKG:   IMGAGING:< from: Xray Chest 1 View-PORTABLE IMMEDIATE (04.13.21 @ 22:51) >    EXAM:  XR CHEST PORTABLE IMMED 1V                            PROCEDURE DATE:  04/13/2021          INTERPRETATION:  AP chest on April 13, 2021 at 10:40 PM. Patient is short of breath with cough and fever.    COMPARISON: None available.    Heart is magnified by technique.    No lung or pleural finding is evident.    There is degeneration of the upper medial left shoulder.    IMPRESSION: Chest unremarkable. Degeneration upper medial left shoulder.    < end of copied text >      ASSESSMENT:  Patient seen and examined at bedside, denies SOB, Nba pain, fever, no new complaint       PLAN:    EVENT: Shortness of breath possible to PE vs. NSTEMI      OBJECTIVE:  Vital Signs Last 24 Hrs  T(C): 36.6 (14 Apr 2021 12:17), Max: 37.7 (13 Apr 2021 21:34)  T(F): 97.9 (14 Apr 2021 12:17), Max: 99.9 (13 Apr 2021 21:34)  HR: 77 (14 Apr 2021 12:17) (77 - 140)  BP: 97/62 (14 Apr 2021 12:17) (92/60 - 153/78)  BP(mean): --  RR: 17 (14 Apr 2021 12:17) (16 - 22)  SpO2: 99% (14 Apr 2021 12:17) (96% - 99%)    LABS:                        11.5   23.08 )-----------( 201      ( 14 Apr 2021 09:43 )             35.8   CARDIAC MARKERS ( 14 Apr 2021 11:48 )  3.190 ng/mL / x     / x     / x     / x      CARDIAC MARKERS ( 14 Apr 2021 05:37 )  4.300 ng/mL / x     / x     / x     / x      CARDIAC MARKERS ( 13 Apr 2021 23:06 )  1.970 ng/mL / x     / x     / x     / x        04-14    137  |  103  |  33<H>  ----------------------------<  199<H>  3.7   |  25  |  2.83<H>    Ca    8.3<L>      14 Apr 2021 05:37  Phos  3.5     04-14  Mg     1.8     04-14    TPro  7.5  /  Alb  2.5<L>  /  TBili  0.8  /  DBili  x   /  AST  49<H>  /  ALT  23  /  AlkPhos  119  04-14      EKG:   IMGAGING:< from: Xray Chest 1 View-PORTABLE IMMEDIATE (04.13.21 @ 22:51) >    EXAM:  XR CHEST PORTABLE IMMED 1V                            PROCEDURE DATE:  04/13/2021          INTERPRETATION:  AP chest on April 13, 2021 at 10:40 PM. Patient is short of breath with cough and fever.    COMPARISON: None available.    Heart is magnified by technique.    No lung or pleural finding is evident.    There is degeneration of the upper medial left shoulder.    IMPRESSION: Chest unremarkable. Degeneration upper medial left shoulder.    < end of copied text >      ASSESSMENT:  Patient seen and examined at bedside, denies SOB, Nba pain, fever, no new complaint   PHYSICAL EXAM:  GENERAL: NAD  HEENT: Normocephalic;  conjunctivae and sclerae clear; moist mucous membranes;   NECK : supple  CHEST/LUNG: Clear to auscultation bilaterally with good air entry   HEART: S1 S2  regular; no murmurs, gallops or rubs  ABDOMEN: Soft, Nontender, Nondistended; Bowel sounds present  EXTREMITIES: B/L lower leg extremities discoloration, no cyanosis; no edema; no calf tenderness  SKIN: warm and dry; no rash  NERVOUS SYSTEM:  Awake and alert; Oriented  to place, person and time ; no new deficits    PLAN:   -c/w telemetry  -c/w heparin drip-elevated d-dimer  -c/w rocephin- UA positive  -f/u ECHO, ECHO department aware  -f/u lower extremities doppler for r/o DVT  -f/u troponin-1.9->4.3->3.9  -f/u d-dimer in AM  -f/u creatinine, new RODRI or RODRI on CKD, unknown baseline,  no nephrology consult until creatinine trend monitoring discussed with medical attending, FENA 0.2% prerenal  -COVID PCR in AM-if negative can get off isolation discussed with infection controlLizabeth

## 2021-04-14 NOTE — CONSULT NOTE ADULT - SUBJECTIVE AND OBJECTIVE BOX
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 Pt states she has intermittent chills, urinary urgency and incontinent for 4 days. Pt denied chest pain, palpitation,  orthopnea, fever, vomiting, abdominal pain, diarrhea, constipation. Pt didn't visit PCP officially for few years, but never told that she has heart or kidney problem. Not on any meds at home. Pt has discoloration and scratched wounds on b/l LLE, she states she scratch as a habit.     in the ED;  Triage reported pox 91% on RA, now Ox 98% on 3L NC.  temp of 99.9, , code sepsis called. Initially 30cc/kg bolus held due to possible COVID viral cause of sepsis.  D-dimer 5600, heparin drip was started. cannot take CTA due to RODRI   (14 Apr 2021 02:03)      PAST MEDICAL & SURGICAL HISTORY:  HTN (hypertension)    S/P cholecystectomy        MEDICATIONS  (STANDING):  cefTRIAXone   IVPB 1000 milliGRAM(s) IV Intermittent every 24 hours  heparin  Infusion.  Unit(s)/Hr (24 mL/Hr) IV Continuous <Continuous>  lactated ringers. 1000 milliLiter(s) (125 mL/Hr) IV Continuous <Continuous>    MEDICATIONS  (PRN):  heparin   Injectable 79258 Unit(s) IV Push every 6 hours PRN For aPTT less than 40  heparin   Injectable 5000 Unit(s) IV Push every 6 hours PRN For aPTT between 40 - 57      FAMILY HISTORY:  FH: heart disease (Mother, Sibling)    FH: Parkinson&#x27;s disease (Mother)      No family history of premature coronary artery disease or sudden cardiac death    SOCIAL HISTORY:  Smoking-[ ] Active  [ ] Former [x ] Non Smoker  Alcohol-[ x] Denies [ ] Social [ ] Daily  Ilicit Drug use-[x ] Denies [ ] Active user    REVIEW OF SYSTEMS:  Constitutional: [ ] fever, [ ]weight loss, [x ]fatigue   Activity [ ] Bedbound,[x ] Ambulates [x ] Unassisted[ ] Cane/Walker [ ] Assistence.  Effort tolerance:[ ] Excellent [ ] Good [ ] Fair [x ] Poor [ ]  Eyes: [ ] visual changes  Respiratory: [ ]shortness of breath;  [x ] cough, [ ]wheezing, [ ]chills, [ ]hemoptysis  Cardiovascular: [ ] chest pain, [ ]palpitations, [ ]dizziness,  [ ]leg swelling[ ]orthopnea [ ]PND  Gastrointestinal: [ ] abdominal pain, [ ]nausea, [ ]vomiting,  [ ]diarrhea,[ ]constipation  Genitourinary: [ ] dysuria, [ ] hematuria  Neurologic: [ ] headaches [ ] tremors[ ] weakness  Skin: [ ] itching, [ ]burning, [ ] rashes  Endocrine: [ ] heat or cold intolerance  Musculoskeletal: [ ] joint pain or swelling; [ ] muscle, back, or extremity pain  Psychiatric: [ ] depression, [ ]anxiety, [ ]mood swings, or [ ]difficulty sleeping  Hematologic: [ ] easy bruising, [ ] bleeding gums       [ x] All others negative	  [ ] Unable to obtain    Vital Signs Last 24 Hrs  T(C): 37 (14 Apr 2021 07:36), Max: 37.7 (13 Apr 2021 21:34)  T(F): 98.6 (14 Apr 2021 07:36), Max: 99.9 (13 Apr 2021 21:34)  HR: 107 (14 Apr 2021 07:36) (107 - 140)  BP: 115/66 (14 Apr 2021 07:36) (92/60 - 153/78)  RR: 16 (14 Apr 2021 07:36) (16 - 22)  SpO2: 99% (14 Apr 2021 07:36) (96% - 99%)  I&O's Summary      PHYSICAL EXAM:  General: No acute distress BMI-24  HEENT: EOMI, PERRL[ ] Icteric  Neck: Supple, No JVD  Lungs: Equal air entry bilaterally; [ ] Rales [ ] Rhonchi [ ] Wheezing  Heart: Regular rate and rhythm;[x ] Murmurs-  2 /6 [x ] Systolic [ ] Diastolic [ ] Radiation,No rubs, or gallops  Abdomen: Nontender, bowel sounds present  Extremities: No clubbing, cyanosis, or edema[ ] Calf tenderness  Nervous system:  Alert & Oriented X3, no focal deficits  Psychiatric: Normal affect  Skin: No rashes or lesions      LABS:  04-14    137  |  103  |  33<H>  ----------------------------<  199<H>  3.7   |  25  |  2.83<H>    Ca    8.3<L>      14 Apr 2021 05:37  Phos  3.5     04-14  Mg     1.8     04-14    TPro  7.5  /  Alb  2.5<L>  /  TBili  0.8  /  DBili  x   /  AST  49<H>  /  ALT  23  /  AlkPhos  119  04-14    Creatinine Trend: 2.83<--, 2.70<--                        11.5   23.08 )-----------( 201      ( 14 Apr 2021 09:43 )             35.8     PT/INR - ( 14 Apr 2021 05:37 )   PT: 14.9 sec;   INR: 1.27 ratio         PTT - ( 14 Apr 2021 09:43 )  PTT:85.2 sec    Lipid Panel: Cholesterol, Serum 152  HDL Cholesterol, Serum 48  Triglycerides, Serum 114    Cardiac Enzymes: CARDIAC MARKERS ( 14 Apr 2021 05:37 )  4.300 ng/mL / x     / x     / x     / x      CARDIAC MARKERS ( 13 Apr 2021 23:06 )  1.970 ng/mL / x     / x     / x     / x          Serum Pro-Brain Natriuretic Peptide: 576 pg/mL (04-13-21 @ 23:06)        RADIOLOGY:XR CHEST PORTABLE IMMED 1V     IMPRESSION: Chest unremarkable. Degeneration upper medial left shoulder.  No lung or pleural finding is evident.    ECG [my interpretation]:Sinus tachycardia at 141 BPM No acute ST T wave abnormalities< end of copied text >

## 2021-04-14 NOTE — ADVANCED PRACTICE NURSE CONSULT - ASSESSMENT
This is a 65yr old female patient admitted for Renal Failure, presenting with Bilateral Lower Extremity ulcerations, to which there will be a Podiatry consultation for evaluation and treatment of this issue. There is currently no further need for wound care specialist consultation at this time

## 2021-04-14 NOTE — H&P ADULT - PROBLEM SELECTOR PLAN 5
- p/w Cr 2.7,   - baseline unknown  - could be  prerenal given poor PO intake (pt didn't eat or drink well 3-4 days)  - FeNa 0.2% (pre-renal)  - c/w IVF   - f/u BMP

## 2021-04-14 NOTE — H&P ADULT - RS GEN PE MLT RESP DETAILS PC
airway patent/breath sounds equal/clear to auscultation bilaterally/no chest wall tenderness/no wheezes

## 2021-04-14 NOTE — CONSULT NOTE ADULT - ASSESSMENT
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 Pt states she has intermittent chills, urinary urgency and incontinent for 4 days. Pt was admitted to [facility] for sepsis likely 2/2/ UTI and suspected PE.    Problem/Plan - 1:  ·  Problem: Sepsis.  Plan: p/wtemp of 99.9,  , WBC count 11 K ,lactate 2.5 ,  /73   - Likely 2/2 UTI   - UA +ve, has urinary symptoms  -s/p vanco 1g, rocephin, 1L NS bolus    - will start on rocephin 1g q24   - will give 1 L bolus NS and maintainence Fluids@ 125 cc LR for 24 hrs    - f/u UCx and BCx.  ID Dr. Long was consulted.     Problem/Plan - 2:  ·  Problem: Suspected pulmonary embolism.  Plan: p/w spO2 91% on RA, tachy 140s, D-dimer 5600  cannot get CTA chest due to RODRI (also pt doesn't look like fitting in CT machine)  on heparin drip  f/u VQ scan if pt can fit in the machine  f/u doppler LE to r/o DVt.     Problem/Plan - 3:  ·  Problem: UTI (urinary tract infection).  Plan: - positive UA   - afebrile but has chills  - no dysuria or flank pain  , has urinary incontinence and urinary urgency   - Leukocytosis 11  - Lactate 2.5   - c/w IV fluids   - started with rocephine 1g daily  - f/u blood cultures (specimen received)   - f/u urine cultures (specimen received)  ** ID consulted Dr. Le.     Problem/Plan - 4:  ·  Problem: Troponin level elevated.  Plan: p/w trop 1.970 ->4.30  No chest pain, EKG no ischemic change  likely due to stress from PE  f/u TTE    Problem/Plan - 5:  ·  Problem: RODRI (acute kidney injury).  Plan: - p/w Cr 2.7,   - baseline unknown  - could be  prerenal given poor PO intake (pt didn't eat or drink well 3-4 days)  - FeNa 0.2% (pre-renal)  - c/w IVF   - f/u BMP.     Problem/Plan - 6:  Problem: HTN (hypertension). Plan: - Pt taking no meds  at home, usual;y 140/70 as per pt    - Monitor BP closely and start medications if clinically indicated.  - DASH diet.    Problem/Plan - 7:  ·  Problem: Alkaline phosphatase elevation.  Plan: p/w    No RUQ pain  f/u GGT.     Problem/Plan - 8:  ·  Problem: Hypokalemia.  Plan: p/w K 3.2  replaced  f/u repeat BMP.     Problem/Plan - 9:  ·  Problem: Prophylactic measure.  Plan: IMPROVE VTE Individual Risk Assessment  RISK                                                                Points  [  ] Previous VTE                                                  3  [  ] Thrombophilia                                               2  [  ] Lower limb paralysis                                      2        (unable to hold up >15 seconds)    [  ] Current Cancer                                              2         (within 6 months)  [x  ] Immobilization > 24 hrs                                1  [  ] ICU/CCU stay > 24 hours                              1  [ x ] Age > 60                                                      1  IMPROVE VTE Score ___2______  Pt is on heparin drip, no need for GI ppx.   
Patient is a 66yo Morbidly obese Female with HLD s/p cholecystectomy p/w chills and SOB. Pt a/w Sepsis 2/2 UTI, concerns for PE/ high suspicion for COVID and RODRI. Now with GNR Bacteremia. Nephrology consulted for Elevated serum creatinine.    1. RODRI- unknown baseline SCr. RODRI in the setting of sepsis vs CKD-4. Pt has not seen a physician in >3 years. UA active likely in the setting of UTI rather than GN.   Spot Upr/Cr 1.98; however inaccurate in the setting of infection. Recc to repeat spot UPr/Cr after infection cleared. FeNa 0.24%. Check TTE with assess EF. If normal EF- recc NS @ 100cc/hr x 24 hrs.   Check Renal US. Strict I/Os. Avoid nephrotoxins/ NSAIDs/ RCA. Monitor BMP.  2. Sepsis 2/2 UTI/ GNR bacteremia-  Pt on Ceftriaxone. Please check UCx. f/u BCx. ID following  3. LE edema- Pt for LE dopplers to r/o DVT. Check TTE. Avoid diuretics at this time. c/w low salt diet.   4. Hypokalemia- resolved s/p repletion. Monitor lytes.     [facility]PHR[facility]  Deandre Morrow M.D.  Matthew Walls D.O.  Mima Baker M.D.  Nadia Vang, MSN, ANP-C  [phone]    [id number] [place] [id number]  
UTI - r/o stone (as she has blood in the urine)  Leukocytosis    Plan: Continue Rocephin 1g iv daily  Awaiting results from urine and blood cultures

## 2021-04-14 NOTE — H&P ADULT - HISTORY OF PRESENT ILLNESS
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 Pt states she has intermittent chills, urinary urgency and incontinent for 4 days. Pt denied chest pain, palpitation,  orthopnea, fever, vomiting, abdominal pain, diarrhea, constipation. Pt didn't visit PCP officially for few years, but never told that she has heart or kidney problem. Not on any meds at home. Pt has discoloration and scratched wounds on b/l LLE, she states she scratch as a habit.     in the ED;  Triage reported pox 91% on RA, now Ox 98% on 3L NC.  temp of 99.9, , code sepsis called. Initially 30cc/kg bolus held due to possible COVID viral cause of sepsis.  D-dimer 5600, heparin drip was started. cannot take CTA due to RODRI

## 2021-04-14 NOTE — ED ADULT NURSE NOTE - OBJECTIVE STATEMENT
Pt stated she was having chills. pt has marks on her legs and arms pt stated it came from her scratching herself.

## 2021-04-14 NOTE — H&P ADULT - PROBLEM SELECTOR PLAN 6
- Pt taking no meds  at home, usual;y 140/70 as per pt    - Monitor BP closely and start medications if clinically indicated.  - DASH diet

## 2021-04-14 NOTE — ED ADULT NURSE NOTE - NSIMPLEMENTINTERV_GEN_ALL_ED
Implemented All Universal Safety Interventions:  [place] to call system. Call bell, personal items and telephone within reach. Instruct patient to call for assistance. Room bathroom lighting operational. Non-slip footwear when patient is off stretcher. Physically safe environment: no spills, clutter or unnecessary equipment. Stretcher in lowest position, wheels locked, appropriate side rails in place.

## 2021-04-14 NOTE — CONSULT NOTE ADULT - SUBJECTIVE AND OBJECTIVE BOX
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 Pt states she has intermittent chills, urinary urgency and incontinent for 4 days. Pt denied chest pain, palpitation,  orthopnea, fever, vomiting, abdominal pain, diarrhea, constipation. Pt didn't visit PCP officially for few years, but never told that she has heart or kidney problem. Not on any meds at home. Pt has discoloration and scratched wounds on b/l LLE, she states she scratch as a habit.     in the ED;  Triage reported pox 91% on RA, now Ox 98% on 3L NC.  temp of 99.9, , code sepsis called. Initially 30cc/kg bolus held due to possible COVID viral cause of sepsis.  D-dimer 5600, heparin drip was started. cannot take CTA due to RODRI   (2021 02:03)    REVIEW OF SYSTEMS:  [  ] Not able to illicit  General:	  Chest:	  GI:	  :  Skin:	  Musculoskeletal:	  Neuro:    PAST MEDICAL & SURGICAL HISTORY:  HTN (hypertension)    S/P cholecystectomy      ALLERGIES: No Known Allergies    MEDS:  cefTRIAXone   IVPB 1000 milliGRAM(s) IV Intermittent every 24 hours  heparin   Injectable 35621 Unit(s) IV Push every 6 hours PRN  heparin   Injectable 5000 Unit(s) IV Push every 6 hours PRN  heparin  Infusion.  Unit(s)/Hr IV Continuous <Continuous>  lactated ringers. 1000 milliLiter(s) IV Continuous <Continuous>    SOCIAL HISTORY:  Smoker:      FAMILY HISTORY:  FH: heart disease (Mother, Sibling)    FH: Parkinson&#x27;s disease (Mother)      VITALS:  Vital Signs Last 24 Hrs  T(C): 36.6 (2021 12:17), Max: 37.7 (2021 21:34)  T(F): 97.9 (2021 12:17), Max: 99.9 (2021 21:34)  HR: 77 (2021 12:17) (77 - 140)  BP: 97/62 (2021 12:17) (92/60 - 153/78)  BP(mean): --  RR: 17 (2021 12:17) (16 - 22)  SpO2: 99% (2021 12:17) (96% - 99%)      PHYSICAL EXAM:  Constitutional:  HEENT:  Neck:  Respiratory:  Cardiovascular:  Gastrointestinal:  Extremities:  Skin:  Ortho:  Neuro:      LABS/DIAGNOSTIC TESTS:                        11.5   23.08 )-----------( 201      ( 2021 09:43 )             35.8     WBC Count: 23.08 K/uL ( @ 09:43)  WBC Count: 26.71 K/uL ( @ 05:37)  WBC Count: 11.56 K/uL ( @ 23:06)        137  |  103  |  33<H>  ----------------------------<  199<H>  3.7   |  25  |  2.83<H>    Ca    8.3<L>      2021 05:37  Phos  3.5       Mg     1.8         TPro  7.5  /  Alb  2.5<L>  /  TBili  0.8  /  DBili  x   /  AST  49<H>  /  ALT  23  /  AlkPhos  119      Urinalysis Basic - ( 2021 03:33 )    Color: Red / Appearance: Turbid / S.015 / pH: x  Gluc: x / Ketone: Trace  / Bili: Negative / Urobili: 1   Blood: x / Protein: 500 mg/dL / Nitrite: Positive   Leuk Esterase: Moderate / RBC: >50 /HPF / WBC 26-50 /HPF   Sq Epi: x / Non Sq Epi: Few /HPF / Bacteria: Many /HPF      LIVER FUNCTIONS - ( 2021 10:13 )  Alb: x     / Pro: x     / ALK PHOS: x     / ALT: x     / AST: x     / GGT: 106 U/L       PT/INR - ( 2021 05:37 )   PT: 14.9 sec;   INR: 1.27 ratio         PTT - ( 2021 09:43 )  PTT:85.2 sec  Lactate, Blood: 2.2 mmol/L ( @ 03:39)  Lactate, Blood: 2.5 mmol/L ( @ 23:06)    ABG -     CULTURES:       RADIOLOGY:  < from: Xray Chest 1 View-PORTABLE IMMEDIATE (21 @ 22:51) >    EXAM:  XR CHEST PORTABLE IMMED 1V                            PROCEDURE DATE:  2021          INTERPRETATION:  AP chest on 2021 at 10:40 PM. Patient is short of breath with cough and fever.    COMPARISON: None available.    Heart is magnified by technique.    No lung or pleural finding is evident.    There is degeneration of the upper medial left shoulder.    IMPRESSION: Chest unremarkable. Degeneration upper medial left shoulder.            THOMAS REYNA M.D., ATTENDING RADIOLOGIST  This document has been electronically signed. 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 Pt states she has intermittent chills, urinary urgency and incontinent for 4 days. Pt denied chest pain, palpitation,  orthopnea, fever, vomiting, abdominal pain, diarrhea, constipation. Pt didn't visit PCP officially for few years, but never told that she has heart or kidney problem. Not on any meds at home. Pt has discoloration and scratched wounds on b/l LLE, she states she scratch as a habit.     in the ED;  Triage reported pox 91% on RA, now Ox 98% on 3L NC.  temp of 99.9, , code sepsis called. Initially 30cc/kg bolus held due to possible COVID viral cause of sepsis.  D-dimer 5600, heparin drip was started. cannot take CTA due to RODRI   (2021 02:03)    History as above, patient admitted from home for chills x 4 days.  She admits to increased urinary frequency and urinary incontinence for the past 2 days, but denies any dysuria or flank pain.  She admits to her urine being dark colored and malodorous.  Patients UA was positive and pending urine culture collection.  Patients WBC count was elevated at 23,000 but patient remains afebrile and denies any fevers at home.  Her blood culture results are pending and patients is currently on a heparin drip for elevated d dimer and positive troponins x 3, but denies any chest pain or shortness of breath currently.    REVIEW OF SYSTEMS:  [  ] Not able to illicit  General: no fevers no malaise, chills +  Chest: no cough, sob + upon exertion, no CP  GI: no nvd no abdominal pain  : urinary incontinence and increased urinary frequency.  Skin: bilateral leg scabs  Musculoskeletal: no trauma no LBP  Neuro: no ha's no dizziness     PAST MEDICAL & SURGICAL HISTORY:  HTN (hypertension)    S/P cholecystectomy      ALLERGIES: No Known Allergies    MEDS:  cefTRIAXone   IVPB 1000 milliGRAM(s) IV Intermittent every 24 hours  heparin   Injectable 11747 Unit(s) IV Push every 6 hours PRN  heparin   Injectable 5000 Unit(s) IV Push every 6 hours PRN  heparin  Infusion.  Unit(s)/Hr IV Continuous <Continuous>  lactated ringers. 1000 milliLiter(s) IV Continuous <Continuous>    SOCIAL HISTORY:  Smoker:  Denies  ETOH: Denies    FAMILY HISTORY:  FH: heart disease (Mother, Sibling)    FH: Parkinson&#x27;s disease (Mother)      VITALS:  Vital Signs Last 24 Hrs  T(C): 36.6 (2021 12:17), Max: 37.7 (2021 21:34)  T(F): 97.9 (2021 12:17), Max: 99.9 (2021 21:34)  HR: 77 (2021 12:17) (77 - 140)  BP: 97/62 (2021 12:17) (92/60 - 153/78)  BP(mean): --  RR: 17 (2021 12:17) (16 - 22)  SpO2: 99% (2021 12:17) (96% - 99%)      PHYSICAL EXAM:  HEENT: normocephalic with moist oral mucosa  Neck: supple no LN's no JVD  Respiratory: lungs clear no rales no rhonchi  Cardiovascular: S1 S2 reg no murmurs  Gastrointestinal: +BS with soft, nondistended abdomen; nontender, obese, no guarding, no rigidity, no organomegaly  Extremities: no edema no cyanosis  Skin: Bilateral leg scabs from scratching  Ortho: no jt swelling  Neuro: AAO x 4        LABS/DIAGNOSTIC TESTS:                        11.5   23.08 )-----------( 201      ( 2021 09:43 )             35.8     WBC Count: 23.08 K/uL ( @ 09:43)  WBC Count: 26.71 K/uL ( @ 05:37)  WBC Count: 11.56 K/uL ( @ 23:06)        137  |  103  |  33<H>  ----------------------------<  199<H>  3.7   |  25  |  2.83<H>    Ca    8.3<L>      2021 05:37  Phos  3.5       Mg     1.8         TPro  7.5  /  Alb  2.5<L>  /  TBili  0.8  /  DBili  x   /  AST  49<H>  /  ALT  23  /  AlkPhos  119  04-14    Urinalysis Basic - ( 2021 03:33 )    Color: Red / Appearance: Turbid / S.015 / pH: x  Gluc: x / Ketone: Trace  / Bili: Negative / Urobili: 1   Blood: x / Protein: 500 mg/dL / Nitrite: Positive   Leuk Esterase: Moderate / RBC: >50 /HPF / WBC 26-50 /HPF   Sq Epi: x / Non Sq Epi: Few /HPF / Bacteria: Many /HPF      LIVER FUNCTIONS - ( 2021 10:13 )  Alb: x     / Pro: x     / ALK PHOS: x     / ALT: x     / AST: x     / GGT: 106 U/L       PT/INR - ( 2021 05:37 )   PT: 14.9 sec;   INR: 1.27 ratio         PTT - ( 2021 09:43 )  PTT:85.2 sec  Lactate, Blood: 2.2 mmol/L ( @ 03:39)  Lactate, Blood: 2.5 mmol/L ( @ 23:06)    ABG -     CULTURES:       RADIOLOGY:  < from: Xray Chest 1 View-PORTABLE IMMEDIATE (21 @ 22:51) >    EXAM:  XR CHEST PORTABLE IMMED 1V                            PROCEDURE DATE:  2021          INTERPRETATION:  AP chest on 2021 at 10:40 PM. Patient is short of breath with cough and fever.    COMPARISON: None available.    Heart is magnified by technique.    No lung or pleural finding is evident.    There is degeneration of the upper medial left shoulder.    IMPRESSION: Chest unremarkable. Degeneration upper medial left shoulder.            THOMAS REYNA M.D., ATTENDING RADIOLOGIST  This document has been electronically signed. 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 Pt states she has intermittent chills, urinary urgency and incontinent for 4 days. Pt denied chest pain, palpitation,  orthopnea, fever, vomiting, abdominal pain, diarrhea, constipation. Pt didn't visit PCP officially for few years, but never told that she has heart or kidney problem. Not on any meds at home. Pt has discoloration and scratched wounds on b/l LLE, she states she scratch as a habit.     in the ED;  Triage reported pox 91% on RA, now Ox 98% on 3L NC.  temp of 99.9, , code sepsis called. Initially 30cc/kg bolus held due to possible COVID viral cause of sepsis.  D-dimer 5600, heparin drip was started. cannot take CTA due to RODRI   (2021 02:03)    History as above, patient admitted from home for chills x 4 days.  She admits to increased urinary frequency and urinary incontinence for the past 2 days, but denies any dysuria or flank pain.  She admits to her urine being dark colored and malodorous.  Patients UA was positive and pending urine culture collection.  Patients WBC count was elevated at 23,000 but patient remains afebrile and denies any fevers at home.  Her blood culture results are pending and patients is currently on a heparin drip for elevated d dimer and positive troponins x 3, but denies any chest pain or shortness of breath currently.    REVIEW OF SYSTEMS:  [  ] Not able to illicit  General: no fevers no malaise, chills +  Chest: no cough, sob + upon exertion, no CP  GI: no nvd no abdominal pain  : urinary incontinence and increased urinary frequency.  Skin: bilateral leg scabs  Musculoskeletal: no trauma no LBP  Neuro: no ha's no dizziness     PAST MEDICAL & SURGICAL HISTORY:  HTN (hypertension)    S/P cholecystectomy      ALLERGIES: No Known Allergies    MEDS:  cefTRIAXone   IVPB 1000 milliGRAM(s) IV Intermittent every 24 hours  heparin   Injectable 90034 Unit(s) IV Push every 6 hours PRN  heparin   Injectable 5000 Unit(s) IV Push every 6 hours PRN  heparin  Infusion.  Unit(s)/Hr IV Continuous <Continuous>  lactated ringers. 1000 milliLiter(s) IV Continuous <Continuous>    SOCIAL HISTORY:  Smoker:  Denies  ETOH: Denies    FAMILY HISTORY:  FH: heart disease (Mother, Sibling)    FH: Parkinson&#x27;s disease (Mother)      VITALS:  Vital Signs Last 24 Hrs  T(C): 36.6 (2021 12:17), Max: 37.7 (2021 21:34)  T(F): 97.9 (2021 12:17), Max: 99.9 (2021 21:34)  HR: 77 (2021 12:17) (77 - 140)  BP: 97/62 (2021 12:17) (92/60 - 153/78)  BP(mean): --  RR: 17 (2021 12:17) (16 - 22)  SpO2: 99% (2021 12:17) (96% - 99%)      PHYSICAL EXAM:  General: obese, no acute distress  HEENT: normocephalic with moist oral mucosa  Neck: supple no LN's no JVD  Respiratory: lungs clear no rales no rhonchi  Cardiovascular: S1 S2 reg no murmurs  Gastrointestinal: +BS with soft, nondistended abdomen; nontender, obese, no guarding, no rigidity, no organomegaly  Extremities: no edema no cyanosis  Skin: Bilateral leg scabs from scratching  Ortho: no jt swelling  Neuro: AAO x 4        LABS/DIAGNOSTIC TESTS:                        11.5   23.08 )-----------( 201      ( 2021 09:43 )             35.8     WBC Count: 23.08 K/uL ( @ 09:43)  WBC Count: 26.71 K/uL ( @ 05:37)  WBC Count: 11.56 K/uL ( @ 23:06)        137  |  103  |  33<H>  ----------------------------<  199<H>  3.7   |  25  |  2.83<H>    Ca    8.3<L>      2021 05:37  Phos  3.5       Mg     1.8         TPro  7.5  /  Alb  2.5<L>  /  TBili  0.8  /  DBili  x   /  AST  49<H>  /  ALT  23  /  AlkPhos  119  04-14    Urinalysis Basic - ( 2021 03:33 )    Color: Red / Appearance: Turbid / S.015 / pH: x  Gluc: x / Ketone: Trace  / Bili: Negative / Urobili: 1   Blood: x / Protein: 500 mg/dL / Nitrite: Positive   Leuk Esterase: Moderate / RBC: >50 /HPF / WBC 26-50 /HPF   Sq Epi: x / Non Sq Epi: Few /HPF / Bacteria: Many /HPF      LIVER FUNCTIONS - ( 2021 10:13 )  Alb: x     / Pro: x     / ALK PHOS: x     / ALT: x     / AST: x     / GGT: 106 U/L       PT/INR - ( 2021 05:37 )   PT: 14.9 sec;   INR: 1.27 ratio         PTT - ( 2021 09:43 )  PTT:85.2 sec  Lactate, Blood: 2.2 mmol/L ( @ 03:39)  Lactate, Blood: 2.5 mmol/L ( @ 23:06)    ABG -     CULTURES:       RADIOLOGY:  < from: Xray Chest 1 View-PORTABLE IMMEDIATE (21 @ 22:51) >    EXAM:  XR CHEST PORTABLE IMMED 1V                            PROCEDURE DATE:  2021          INTERPRETATION:  AP chest on 2021 at 10:40 PM. Patient is short of breath with cough and fever.    COMPARISON: None available.    Heart is magnified by technique.    No lung or pleural finding is evident.    There is degeneration of the upper medial left shoulder.    IMPRESSION: Chest unremarkable. Degeneration upper medial left shoulder.            THOMAS REYNA M.D., ATTENDING RADIOLOGIST  This document has been electronically signed. 2021  8:06AM    < end of copied text >

## 2021-04-14 NOTE — H&P ADULT - PROBLEM SELECTOR PLAN 3
- positive UA   - afebrile but has chills  - no dysuria or flank pain  , has urinary incontinence and urinary urgency   - Leukocytosis 11  - Lactate 2.5   - c/w IV fluids   - started with rocephine 1g daily  - f/u blood cultures (specimen received)   - f/u urine cultures (specimen received)  ** ID consulted Dr. Le

## 2021-04-15 DIAGNOSIS — R74.8 ABNORMAL LEVELS OF OTHER SERUM ENZYMES: ICD-10-CM

## 2021-04-15 LAB
ALBUMIN SERPL ELPH-MCNC: 2.3 G/DL — LOW (ref 3.5–5)
ALP SERPL-CCNC: 125 U/L — HIGH (ref 40–120)
ALT FLD-CCNC: 36 U/L DA — SIGNIFICANT CHANGE UP (ref 10–60)
ANION GAP SERPL CALC-SCNC: 9 MMOL/L — SIGNIFICANT CHANGE UP (ref 5–17)
APTT BLD: 68.2 SEC — HIGH (ref 27.5–35.5)
AST SERPL-CCNC: 126 U/L — HIGH (ref 10–40)
BILIRUB SERPL-MCNC: 0.5 MG/DL — SIGNIFICANT CHANGE UP (ref 0.2–1.2)
BUN SERPL-MCNC: 34 MG/DL — HIGH (ref 7–18)
CALCIUM SERPL-MCNC: 8.2 MG/DL — LOW (ref 8.4–10.5)
CHLORIDE SERPL-SCNC: 103 MMOL/L — SIGNIFICANT CHANGE UP (ref 96–108)
CO2 SERPL-SCNC: 25 MMOL/L — SIGNIFICANT CHANGE UP (ref 22–31)
COVID-19 SPIKE DOMAIN AB INTERP: NEGATIVE — SIGNIFICANT CHANGE UP
COVID-19 SPIKE DOMAIN ANTIBODY RESULT: 0.4 U/ML — SIGNIFICANT CHANGE UP
CREAT SERPL-MCNC: 1.71 MG/DL — HIGH (ref 0.5–1.3)
CULTURE RESULTS: NO GROWTH — SIGNIFICANT CHANGE UP
D DIMER BLD IA.RAPID-MCNC: 927 NG/ML DDU — HIGH
GLUCOSE SERPL-MCNC: 131 MG/DL — HIGH (ref 70–99)
GRAM STN FLD: SIGNIFICANT CHANGE UP
HAV IGM SER-ACNC: SIGNIFICANT CHANGE UP
HBV CORE IGM SER-ACNC: SIGNIFICANT CHANGE UP
HBV SURFACE AG SER-ACNC: SIGNIFICANT CHANGE UP
HCT VFR BLD CALC: 34.6 % — SIGNIFICANT CHANGE UP (ref 34.5–45)
HCV AB S/CO SERPL IA: 0.21 S/CO — SIGNIFICANT CHANGE UP (ref 0–0.99)
HCV AB S/CO SERPL IA: 0.23 S/CO — SIGNIFICANT CHANGE UP (ref 0–0.99)
HCV AB SERPL-IMP: SIGNIFICANT CHANGE UP
HCV AB SERPL-IMP: SIGNIFICANT CHANGE UP
HGB BLD-MCNC: 10.9 G/DL — LOW (ref 11.5–15.5)
MAGNESIUM SERPL-MCNC: 2 MG/DL — SIGNIFICANT CHANGE UP (ref 1.6–2.6)
MCHC RBC-ENTMCNC: 28.9 PG — SIGNIFICANT CHANGE UP (ref 27–34)
MCHC RBC-ENTMCNC: 31.5 GM/DL — LOW (ref 32–36)
MCV RBC AUTO: 91.8 FL — SIGNIFICANT CHANGE UP (ref 80–100)
NRBC # BLD: 0 /100 WBCS — SIGNIFICANT CHANGE UP (ref 0–0)
PHOSPHATE SERPL-MCNC: 2.3 MG/DL — LOW (ref 2.5–4.5)
PLATELET # BLD AUTO: 202 K/UL — SIGNIFICANT CHANGE UP (ref 150–400)
POTASSIUM SERPL-MCNC: 3.4 MMOL/L — LOW (ref 3.5–5.3)
POTASSIUM SERPL-SCNC: 3.4 MMOL/L — LOW (ref 3.5–5.3)
PROT SERPL-MCNC: 7.1 G/DL — SIGNIFICANT CHANGE UP (ref 6–8.3)
RBC # BLD: 3.77 M/UL — LOW (ref 3.8–5.2)
RBC # FLD: 14.8 % — HIGH (ref 10.3–14.5)
SARS-COV-2 IGG+IGM SERPL QL IA: 0.4 U/ML — SIGNIFICANT CHANGE UP
SARS-COV-2 IGG+IGM SERPL QL IA: NEGATIVE — SIGNIFICANT CHANGE UP
SODIUM SERPL-SCNC: 137 MMOL/L — SIGNIFICANT CHANGE UP (ref 135–145)
SPECIMEN SOURCE: SIGNIFICANT CHANGE UP
WBC # BLD: 12.55 K/UL — HIGH (ref 3.8–10.5)
WBC # FLD AUTO: 12.55 K/UL — HIGH (ref 3.8–10.5)

## 2021-04-15 PROCEDURE — 93970 EXTREMITY STUDY: CPT | Mod: 26

## 2021-04-15 PROCEDURE — 76775 US EXAM ABDO BACK WALL LIM: CPT | Mod: 26

## 2021-04-15 PROCEDURE — 99233 SBSQ HOSP IP/OBS HIGH 50: CPT | Mod: GC

## 2021-04-15 RX ORDER — POTASSIUM CHLORIDE 20 MEQ
40 PACKET (EA) ORAL ONCE
Refills: 0 | Status: COMPLETED | OUTPATIENT
Start: 2021-04-15 | End: 2021-04-15

## 2021-04-15 RX ORDER — ACETAMINOPHEN 500 MG
650 TABLET ORAL ONCE
Refills: 0 | Status: COMPLETED | OUTPATIENT
Start: 2021-04-15 | End: 2021-04-15

## 2021-04-15 RX ORDER — POTASSIUM PHOSPHATE, MONOBASIC POTASSIUM PHOSPHATE, DIBASIC 236; 224 MG/ML; MG/ML
15 INJECTION, SOLUTION INTRAVENOUS ONCE
Refills: 0 | Status: COMPLETED | OUTPATIENT
Start: 2021-04-15 | End: 2021-04-15

## 2021-04-15 RX ADMIN — POTASSIUM PHOSPHATE, MONOBASIC POTASSIUM PHOSPHATE, DIBASIC 62.5 MILLIMOLE(S): 236; 224 INJECTION, SOLUTION INTRAVENOUS at 18:18

## 2021-04-15 RX ADMIN — Medication 40 MILLIEQUIVALENT(S): at 10:49

## 2021-04-15 RX ADMIN — Medication 650 MILLIGRAM(S): at 14:30

## 2021-04-15 RX ADMIN — Medication 650 MILLIGRAM(S): at 14:34

## 2021-04-15 NOTE — PROGRESS NOTE ADULT - PROBLEM SELECTOR PLAN 5
- p/w Cr 2.7,   - baseline unknown  - could be  prerenal given poor PO intake (pt didn't eat or drink well 3-4 days)  - FeNa 0.2% (pre-renal)  - c/w IVF   - f/u BMP - p/w Cr 2.7; unknown baseline  - likely prerenal 2/2 poor PO intake  - 2.7>>1.7  - FeNa 0.2%   - c/w IVF   - monitor BMP

## 2021-04-15 NOTE — PROGRESS NOTE ADULT - PROBLEM SELECTOR PLAN 3
- positive UA   - afebrile but has chills  - no dysuria or flank pain  , has urinary incontinence and urinary urgency   - Leukocytosis 11  - Lactate 2.5   - c/w IV fluids   - started with rocephine 1g daily  - f/u blood cultures (specimen received)   - f/u urine cultures (specimen received)  ** ID consulted Dr. Le - as above

## 2021-04-15 NOTE — PROGRESS NOTE ADULT - ASSESSMENT
UTI   Bacteremia  Leukocytosis - improving    Plan: Continue Rocephin 1g iv daily  Awaiting results from urine and blood cultures UTI   Bacteremia - proteus mirabilis  Leukocytosis - improving    Plan: Continue Rocephin 1g iv daily  Awaiting results from urine cultures UTI   Bacteremia - proteus mirabilis  Leukocytosis - improving    Plan: Continue Rocephin 1g iv daily  Awaiting results from urine cultures  Repeat blood cultures tomorrow morning UTI   Bacteremia - proteus mirabilis  Leukocytosis - improving    Plan: Continue Rocephin 1g iv daily  Awaiting results from urine cultures  Repeat blood cultures tomorrow morning    I agree with above

## 2021-04-15 NOTE — PROGRESS NOTE ADULT - SUBJECTIVE AND OBJECTIVE BOX
[facility] NEPHROLOGY- PROGRESS NOTE    Patient is a 66yo Morbidly obese Female with HLD s/p cholecystectomy p/w chills and SOB. Pt a/w Sepsis 2/2 UTI, concerns for PE/ high suspicion for COVID and RODRI. Now with GNR Bacteremia. Nephrology consulted for Elevated serum creatinine.    Hospital Medications: Medications reviewed.  REVIEW OF SYSTEMS:  CONSTITUTIONAL: No fevers or chills  RESPIRATORY: No shortness of breath at rest +SNYDER  CARDIOVASCULAR: No chest pain.  GASTROINTESTINAL: No nausea, vomiting, diarrhea or abdominal pain.   VASCULAR: No bilateral lower extremity edema.     VITALS:  T(F): 98.4 (04-15-21 @ 11:10), Max: 99.3 (21 @ 17:35)  HR: 99 (04-15-21 @ 11:10)  BP: 137/92 (04-15-21 @ 11:10)  RR: 18 (04-15-21 @ 11:10)  SpO2: 98% (04-15-21 @ 11:10)  Wt(kg): --  Height (cm): 175.3 ( @ 21:34)  Weight (kg): 169.2 ( @ 21:34)  BMI (kg/m2): 55.1 ( 21:34)  BSA (m2): 2.69 ( @ 21:34)    04-15 @ 07:01  -  04-15 @ 15:09  --------------------------------------------------------  IN: 230 mL / OUT: 0 mL / NET: 230 mL      PHYSICAL EXAM:  Gen: NAD, morbidly obese  Cards: RRR, +S1/S2, no M/G/R  Resp: CTA B/L  GI: soft, NT/ND, +abd hernia  : no CVA tenderness  Extremities: +1 LE edema B/L  Derm: small excoriation scabs      LABS:  04-15    137  |  103  |  34<H>  ----------------------------<  131<H>  3.4<L>   |  25  |  1.71<H>    Ca    8.2<L>      15 Apr 2021 06:26  Phos  2.3     04-15  Mg     2.0     04-15    TPro  7.1  /  Alb  2.3<L>  /  TBili  0.5  /  DBili      /  AST  126<H>  /  ALT  36  /  AlkPhos  125<H>  04-15    Creatinine Trend: 1.71 <--, 2.83 <--, 2.70 <--                        10.9   12.55 )-----------( 202      ( 15 Apr 2021 06:26 )             34.6     Urine Studies:  Urinalysis Basic - ( 2021 03:33 )    Color: Red / Appearance: Turbid / S.015 / pH:   Gluc:  / Ketone: Trace  / Bili: Negative / Urobili: 1   Blood:  / Protein: 500 mg/dL / Nitrite: Positive   Leuk Esterase: Moderate / RBC: >50 /HPF / WBC 26-50 /HPF   Sq Epi:  / Non Sq Epi: Few /HPF / Bacteria: Many /HPF      Sodium, Random Urine: 35 mmol/L ( @ 03:32)  Creatinine, Random Urine: 301 mg/dL ( @ 03:32)  Osmolality, Random Urine: 481 mos/kg ( @ 03:32)    RADIOLOGY & ADDITIONAL STUDIES:    < from: US Renal (04.15.21 @ 10:40) >  IMPRESSION:    The left kidney is not visualized due to patient's body habitus.    No right hydronephrosis.    2 small echogenic foci in the right kidney, suggestive of nonobstructive calculi.      < end of copied text >  < from: US Renal (04.15.21 @ 10:40) >    EXAM:  US KIDNEY(S)                            PROCEDURE DATE:  04/15/2021      < end of copied text >

## 2021-04-15 NOTE — PROGRESS NOTE ADULT - PROBLEM SELECTOR PLAN 6
- Pt taking no meds  at home, usual;y 140/70 as per pt    - Monitor BP closely and start medications if clinically indicated.  - DASH diet - h/o HTN, on no meds at home  - monitor BP

## 2021-04-15 NOTE — PROGRESS NOTE ADULT - ASSESSMENT
Patient is a 66yo Morbidly obese Female with HLD s/p cholecystectomy p/w chills and SOB. Pt a/w Sepsis 2/2 UTI, concerns for PE/ high suspicion for COVID and RODRI. Now with GNR Bacteremia. Nephrology consulted for Elevated serum creatinine.    1. RODRI- unknown baseline SCr. RODRI in the setting of sepsis. Renal function improving on IVF. TTE with normal EF.  FeNa 0.24%. Recc NS @ 100cc/hr.   Pt has not seen a physician in >3 years. Spoke with PMD's office last SCr 1.08 (eGFR on 55ml/min) on 7/6/2018. UA active likely in the setting of UTI rather than GN.   Spot Upr/Cr 1.98; however inaccurate in the setting of infection. Recc to repeat spot UPr/Cr after infection cleared.   Renal US with no right hydro(did not visualize left kidney). Strict I/Os. Avoid nephrotoxins/ NSAIDs/ RCA. Monitor BMP.  2. Sepsis 2/2 UTI/ GNR bacteremia-  Pt on Ceftriaxone. UCx pending; will f/u. f/u BCx. ID following  3. LE edema- No LE DVT seen on dopplers. TTE with normal EF. Avoid diuretics at this time. c/w low salt diet.   4. Hypokalemia- Pt given KCl 40meq PO x1.  Monitor lytes.     [facility] NEPHROLOGY  Deandre Morrow M.D.  Matthew Walls D.O.  Mima Baker M.D.  Nadia Vang, MSN, ANP-C  [phone]    [address] [place] [address]

## 2021-04-15 NOTE — PROGRESS NOTE ADULT - SUBJECTIVE AND OBJECTIVE BOX
PGY-1 Progress Note discussed with attending    PAGER #: [phone]] TILL 5:00 PM  PLEASE CONTACT ON CALL TEAM:  - On Call Team (Please refer to Ayan) FROM 5:00 PM - 8:30PM  - Nightfloat Team FROM 8:30 -7:30 AM    OVERNIGHT EVENTS:   - Patient reports significant improvement in her status; however, remains to have urinary frequency. She denies fever, chills and shivering.     REVIEW OF SYSTEMS:  CONSTITUTIONAL: No fever, weight loss, or fatigue  RESPIRATORY: No cough, wheezing, chills or hemoptysis; No shortness of breath  CARDIOVASCULAR: No chest pain, palpitations, dizziness, or leg swelling  GASTROINTESTINAL: No abdominal pain. No nausea, vomiting, or hematemesis; No diarrhea or constipation. No melena or hematochezia.  GENITOURINARY: complains of urinary frequency; No dysuria or hematuria  NEUROLOGICAL: No headaches, memory loss, loss of strength, numbness, or tremors  SKIN: No itching, burning, rashes, or lesions     MEDICATIONS  (STANDING):  cefTRIAXone   IVPB 1000 milliGRAM(s) IV Intermittent every 24 hours  heparin  Infusion.  Unit(s)/Hr (24 mL/Hr) IV Continuous <Continuous>  lactated ringers. 1000 milliLiter(s) (125 mL/Hr) IV Continuous <Continuous>  potassium chloride    Tablet ER 40 milliEquivalent(s) Oral once  potassium phosphate IVPB 15 milliMole(s) IV Intermittent once    MEDICATIONS  (PRN):  heparin   Injectable 02085 Unit(s) IV Push every 6 hours PRN For aPTT less than 40  heparin   Injectable 5000 Unit(s) IV Push every 6 hours PRN For aPTT between 40 - 57      Vital Signs Last 24 Hrs  T(C): 37 (15 Apr 2021 07:29), Max: 37.4 (14 Apr 2021 17:35)  T(F): 98.6 (15 Apr 2021 07:29), Max: 99.3 (14 Apr 2021 17:35)  HR: 107 (15 Apr 2021 07:29) (77 - 111)  BP: 133/70 (15 Apr 2021 07:29) (97/62 - 133/70)  BP(mean): --  RR: 18 (15 Apr 2021 07:29) (17 - 19)  SpO2: 98% (15 Apr 2021 07:29) (96% - 99%)    PHYSICAL EXAMINATION:  GENERAL: NAD, AAOx3, obese  HEAD: AT/NC  EYES: conjunctiva and sclera clear  NECK: supple, No JVD noted, Normal thyroid  CHEST/LUNG: CTABL; no rales, rhonchi, wheezing, or rubs  HEART: regular rate and rhythm; no murmurs, rubs, or gallops  ABDOMEN: soft, nontender, nondistended; Bowel sounds present  EXTREMITIES:  2+ Peripheral Pulses, No clubbing, cyanosis, or edema  SKIN: warm dry                          10.9   12.55 )-----------( 202      ( 15 Apr 2021 06:26 )             34.6     04-15    137  |  103  |  34<H>  ----------------------------<  131<H>  3.4<L>   |  25  |  1.71<H>    Ca    8.2<L>      15 Apr 2021 06:26  Phos  2.3     04-15  Mg     2.0     04-15    TPro  7.1  /  Alb  2.3<L>  /  TBili  0.5  /  DBili  x   /  AST  126<H>  /  ALT  36  /  AlkPhos  125<H>  04-15    LIVER FUNCTIONS - ( 15 Apr 2021 06:26 )  Alb: 2.3 g/dL / Pro: 7.1 g/dL / ALK PHOS: 125 U/L / ALT: 36 U/L DA / AST: 126 U/L / GGT: x           CARDIAC MARKERS ( 14 Apr 2021 16:22 )  3.130 ng/mL / x     / x     / x     / x      CARDIAC MARKERS ( 14 Apr 2021 11:48 )  3.190 ng/mL / x     / x     / x     / x      CARDIAC MARKERS ( 14 Apr 2021 05:37 )  4.300 ng/mL / x     / x     / x     / x      CARDIAC MARKERS ( 13 Apr 2021 23:06 )  1.970 ng/mL / x     / x     / x     / x          PT/INR - ( 14 Apr 2021 05:37 )   PT: 14.9 sec;   INR: 1.27 ratio         PTT - ( 15 Apr 2021 06:26 )  PTT:68.2 sec  COVID-19 PCR: NotDetec (14 Apr 2021 18:55)  SARS-CoV-2: NotDetec (13 Apr 2021 23:06)      CAPILLARY BLOOD GLUCOSE          RADIOLOGY & ADDITIONAL TESTS:

## 2021-04-15 NOTE — PROGRESS NOTE ADULT - SUBJECTIVE AND OBJECTIVE BOX
65y Female is under our care for     REVIEW OF SYSTEMS:  [  ] Not able to illicit  General:	  Chest:	  GI:	  :  Skin:	  Musculoskeletal:	  Neuro:	    MEDS:  cefTRIAXone   IVPB 1000 milliGRAM(s) IV Intermittent every 24 hours    ALLERGIES: Allergies    No Known Allergies    Intolerances        VITALS:  Vital Signs Last 24 Hrs  T(C): 36.9 (15 Apr 2021 11:10), Max: 37.4 (14 Apr 2021 17:35)  T(F): 98.4 (15 Apr 2021 11:10), Max: 99.3 (14 Apr 2021 17:35)  HR: 99 (15 Apr 2021 11:10) (94 - 111)  BP: 137/92 (15 Apr 2021 11:10) (98/54 - 137/92)  BP(mean): --  RR: 18 (15 Apr 2021 11:10) (17 - 19)  SpO2: 98% (15 Apr 2021 11:10) (96% - 99%)      PHYSICAL EXAM:  HEENT:  Neck:  Respiratory:  Cardiovascular:  Gastrointestinal:  Extremities:  Skin:  Ortho:  Neuro:    LABS/DIAGNOSTIC TESTS:                        10.9   12.55 )-----------( 202      ( 15 Apr 2021 06:26 )             34.6     WBC Count: 12.55 K/uL (04-15 @ 06:26)  WBC Count: 17.41 K/uL (04-14 @ 16:21)  WBC Count: 23.08 K/uL (04-14 @ 09:43)  WBC Count: 26.71 K/uL (04-14 @ 05:37)  WBC Count: 11.56 K/uL (04-13 @ 23:06)    04-15    137  |  103  |  34<H>  ----------------------------<  131<H>  3.4<L>   |  25  |  1.71<H>    Ca    8.2<L>      15 Apr 2021 06:26  Phos  2.3     04-15  Mg     2.0     04-15    TPro  7.1  /  Alb  2.3<L>  /  TBili  0.5  /  DBili  x   /  AST  126<H>  /  ALT  36  /  AlkPhos  125<H>  04-15      CULTURES:   .Blood Blood-Peripheral  04-14 @ 02:59   Growth in aerobic bottle: Gram Negative Rods  Growth in anaerobic bottle: Gram Negative Rods  ***Blood Panel PCR results on this specimen are available  approximately 3 hours after the Gram stain result.***  Gram stain, PCR, and/or culture results may not always  correspond due to difference in methodologies.  ************************************************************  This PCR assay was performed by multiplex PCR. This  Assay tests for 66 bacterial and resistance gene targets.  Please refer to the [employer] test directory  at [email or web address] for details.  --  Blood Culture PCR        RADIOLOGY:   < from: US Renal (04.15.21 @ 10:40) >    EXAM:  US KIDNEY(S)                            PROCEDURE DATE:  04/15/2021          INTERPRETATION:  CLINICAL INFORMATION: Sepsis, bacteremia, urinary tract infection and hematuria    COMPARISON: None available.    TECHNIQUE: Sonography of the kidneys and bladder.    FINDINGS:    Right kidney: 12.5 cm. No renal mass, or hydronephrosis. 2 small echogenic foci measuring up to 7 mm, suggestive of nonobstructive calculi.    Left kidney: Not visualized due to patient's body habitus.    Urinary bladder: Grossly unremarkable.    Hepatic steatosis is incidentally noted.    IMPRESSION:    The left kidney is not visualized due to patient's body habitus.    No right hydronephrosis.    2 small echogenic foci in the right kidney, suggestive of nonobstructive calculi.              NATHALY QUARLES MD; Attending Radiologist  This document has been electronically signed. Apr 15 2021 11:35AM    < end of copied text >   65y Female is under our care for UTI.  Patient was seen laying comfortably in bed with no acute distress.  Patient remains afebrile and WBC count is trending down.  Her urine culture results are pending but patients blood cultures are positive for proteus mirabilis.  Patients urinary symptoms have slightly improved, she has less periods of incontinence and frequency is slightly less.  Patient is still on a heparin drip.    REVIEW OF SYSTEMS:  [  ] Not able to illicit  General: no fevers no malaise,   Chest: no cough, sob + upon exertion, no CP  GI: no nvd no abdominal pain  : urinary incontinence and urinary frequency.  Skin: bilateral leg scabs  Musculoskeletal: no trauma no LBP  Neuro: no ha's no dizziness     MEDS:  cefTRIAXone   IVPB 1000 milliGRAM(s) IV Intermittent every 24 hours    ALLERGIES: Allergies    No Known Allergies    Intolerances        VITALS:  Vital Signs Last 24 Hrs  T(C): 36.9 (15 Apr 2021 11:10), Max: 37.4 (14 Apr 2021 17:35)  T(F): 98.4 (15 Apr 2021 11:10), Max: 99.3 (14 Apr 2021 17:35)  HR: 99 (15 Apr 2021 11:10) (94 - 111)  BP: 137/92 (15 Apr 2021 11:10) (98/54 - 137/92)  BP(mean): --  RR: 18 (15 Apr 2021 11:10) (17 - 19)  SpO2: 98% (15 Apr 2021 11:10) (96% - 99%)      PHYSICAL EXAM:  General: obese, no acute distress  HEENT: normocephalic with moist oral mucosa  Neck: supple no LN's no JVD  Respiratory: lungs clear no rales no rhonchi  Cardiovascular: S1 S2 reg no murmurs  Gastrointestinal: +BS with soft, nondistended abdomen; nontender, obese  Extremities: no edema no cyanosis  Skin: Bilateral leg scabs from scratching  Ortho: no jt swelling  Neuro: AAO x 4      LABS/DIAGNOSTIC TESTS:                        10.9   12.55 )-----------( 202      ( 15 Apr 2021 06:26 )             34.6     WBC Count: 12.55 K/uL (04-15 @ 06:26)  WBC Count: 17.41 K/uL (04-14 @ 16:21)  WBC Count: 23.08 K/uL (04-14 @ 09:43)  WBC Count: 26.71 K/uL (04-14 @ 05:37)  WBC Count: 11.56 K/uL (04-13 @ 23:06)    04-15    137  |  103  |  34<H>  ----------------------------<  131<H>  3.4<L>   |  25  |  1.71<H>    Ca    8.2<L>      15 Apr 2021 06:26  Phos  2.3     04-15  Mg     2.0     04-15    TPro  7.1  /  Alb  2.3<L>  /  TBili  0.5  /  DBili  x   /  AST  126<H>  /  ALT  36  /  AlkPhos  125<H>  04-15      CULTURES:   .Blood Blood-Peripheral  04-14 @ 02:59   Growth in aerobic bottle: Gram Negative Rods  Growth in anaerobic bottle: Gram Negative Rods  ***Blood Panel PCR results on this specimen are available  approximately 3 hours after the Gram stain result.***  Gram stain, PCR, and/or culture results may not always  correspond due to difference in methodologies.  ************************************************************  This PCR assay was performed by multiplex PCR. This  Assay tests for 66 bacterial and resistance gene targets.  Please refer to the [facility] [employer] test directory  at [email or web address] for details.  --  Blood Culture PCR        RADIOLOGY:   < from: US Renal (04.15.21 @ 10:40) >    EXAM:  US KIDNEY(S)                            PROCEDURE DATE:  04/15/2021          INTERPRETATION:  CLINICAL INFORMATION: Sepsis, bacteremia, urinary tract infection and hematuria    COMPARISON: None available.    TECHNIQUE: Sonography of the kidneys and bladder.    FINDINGS:    Right kidney: 12.5 cm. No renal mass, or hydronephrosis. 2 small echogenic foci measuring up to 7 mm, suggestive of nonobstructive calculi.    Left kidney: Not visualized due to patient's body habitus.    Urinary bladder: Grossly unremarkable.    Hepatic steatosis is incidentally noted.    IMPRESSION:    The left kidney is not visualized due to patient's body habitus.    No right hydronephrosis.    2 small echogenic foci in the right kidney, suggestive of nonobstructive calculi.              NATHALY QUARLES MD; Attending Radiologist  This document has been electronically signed. Apr 15 2021 11:35AM    < end of copied text >

## 2021-04-15 NOTE — PROGRESS NOTE ADULT - PROBLEM SELECTOR PLAN 1
p/wtemp of 99.9,  , WBC count 11 K ,lactate 2.5 ,  /73   - Likely 2/2 UTI   - UA +ve, has urinary symptoms  -s/p vanco 1g, rocephin, 1L NS bolus    - will start on rocephin 1g q24   - will give 1 L bolus NS and maintainence Fluids@ 125 cc LR for 24 hrs    - f/u UCx and BCx.  ID Dr. Long was consulted - p/w shievering anf fever, associated w/ urinary urgency & frequency  - admission: temp of 99.9, , WBC 11K, lactate 2.5,  /73   - Likely 2/2 UTI   - UA pos  - Bcx prelim gram neg ninfa  - s/p vanco x1, rocephin x1, 1L NS bolus in ED    - c/w rocephin 1g q24h, IVF  - f/u UCx and BCx    - ID, Dr. Long, [place]

## 2021-04-15 NOTE — PROGRESS NOTE ADULT - PROBLEM SELECTOR PLAN 8
p/w K 3.2  replaced  f/u repeat BMP IMPROVE VTE Individual Risk Assessment  RISK                                                                Points  [  ] Previous VTE                                                  3  [  ] Thrombophilia                                               2  [  ] Lower limb paralysis                                      2        (unable to hold up >15 seconds)    [  ] Current Cancer                                              2         (within 6 months)  [x  ] Immobilization > 24 hrs                                1  [  ] ICU/CCU stay > 24 hours                              1  [ x ] Age > 60                                                      1  IMPROVE VTE Score ___2______  Pt is on heparin drip, no need for GI ppx

## 2021-04-15 NOTE — PROGRESS NOTE ADULT - PROBLEM SELECTOR PLAN 2
p/w spO2 91% on RA, tachy 140s, D-dimer 5600  cannot get CTA chest due to RODRI (also pt doesn't look like fitting in CT machine)  on heparin drip  f/u VQ scan if pt can fit in the machine  f/u doppler LE to r/o DVt - p/w spO2 91% on RA, tachy 140s, D-dimer 5600  - c/w empiric heparin ggt  - no CTA 2/2 RODRI  - no V/Q 2/2 weight limit  - f/u doppler LE to r/o DVT

## 2021-04-15 NOTE — PROGRESS NOTE ADULT - ASSESSMENT
Patient is a 65 year old Female, from home, morbid obese, with PMH of HTN (not on any meds),  PSH of cholecystectomy presented to the ED complaining of chills x 4 days and SOB. Pt states she has intermittent chills, urinary urgency and incontinent for 4 days. Pt was admitted to [facility] for sepsis likely 2/2/ UTI and suspected PE.      in the ED;  Triage reported pox 91% on RA, now Ox 98% on 3L NC.  temp of 99.9, , code sepsis called. Initially 30cc/kg bolus held due to possible COVID viral cause of sepsis.  D-dimer 5600, heparin drip was started. cannot take CTA due to RODRI     Patient is a 65 year old Female, from home, morbid obese, with PMH of HTN (not on any meds) and PSH of cholecystectomy, presented with shivering and chills for 4 day, associated with urinary frequency. She was admitted to [facility] for sepsis likely 2/2/ UTI and suspected PE.

## 2021-04-15 NOTE — PROGRESS NOTE ADULT - PROBLEM SELECTOR PLAN 7
p/w    No RUQ pain  f/u GGT - trended down    - p/w trop 1.970>4.3>3.1  - likely 2/2 PE  - f/u TTE    - Cardio, Dr. Admae, onboard

## 2021-04-15 NOTE — PROGRESS NOTE ADULT - PROBLEM SELECTOR PLAN 4
p/w trop 1.970 ->4.30  No chest pain, EKG no ischemic change  likely due to stress from PE  f/u TTE  Cardio Dr. Adame consulted - noted to have abnormal LFTs on admission  -   -   - ALT 36  -   - f/u hepatitis panel

## 2021-04-16 LAB
-  AMIKACIN: SIGNIFICANT CHANGE UP
-  AMPICILLIN/SULBACTAM: SIGNIFICANT CHANGE UP
-  AMPICILLIN: SIGNIFICANT CHANGE UP
-  AZTREONAM: SIGNIFICANT CHANGE UP
-  CEFAZOLIN: SIGNIFICANT CHANGE UP
-  CEFEPIME: SIGNIFICANT CHANGE UP
-  CEFOXITIN: SIGNIFICANT CHANGE UP
-  CEFTRIAXONE: SIGNIFICANT CHANGE UP
-  CIPROFLOXACIN: SIGNIFICANT CHANGE UP
-  ERTAPENEM: SIGNIFICANT CHANGE UP
-  GENTAMICIN: SIGNIFICANT CHANGE UP
-  LEVOFLOXACIN: SIGNIFICANT CHANGE UP
-  MEROPENEM: SIGNIFICANT CHANGE UP
-  PIPERACILLIN/TAZOBACTAM: SIGNIFICANT CHANGE UP
-  TOBRAMYCIN: SIGNIFICANT CHANGE UP
-  TRIMETHOPRIM/SULFAMETHOXAZOLE: SIGNIFICANT CHANGE UP
ALBUMIN SERPL ELPH-MCNC: 2.3 G/DL — LOW (ref 3.5–5)
ALP SERPL-CCNC: 163 U/L — HIGH (ref 40–120)
ALT FLD-CCNC: 40 U/L DA — SIGNIFICANT CHANGE UP (ref 10–60)
ANION GAP SERPL CALC-SCNC: 9 MMOL/L — SIGNIFICANT CHANGE UP (ref 5–17)
APTT BLD: 76.8 SEC — HIGH (ref 27.5–35.5)
AST SERPL-CCNC: 81 U/L — HIGH (ref 10–40)
BASOPHILS # BLD AUTO: 0.07 K/UL — SIGNIFICANT CHANGE UP (ref 0–0.2)
BASOPHILS NFR BLD AUTO: 0.6 % — SIGNIFICANT CHANGE UP (ref 0–2)
BILIRUB SERPL-MCNC: 0.4 MG/DL — SIGNIFICANT CHANGE UP (ref 0.2–1.2)
BUN SERPL-MCNC: 26 MG/DL — HIGH (ref 7–18)
CALCIUM SERPL-MCNC: 8.5 MG/DL — SIGNIFICANT CHANGE UP (ref 8.4–10.5)
CHLORIDE SERPL-SCNC: 104 MMOL/L — SIGNIFICANT CHANGE UP (ref 96–108)
CO2 SERPL-SCNC: 26 MMOL/L — SIGNIFICANT CHANGE UP (ref 22–31)
CREAT SERPL-MCNC: 1.35 MG/DL — HIGH (ref 0.5–1.3)
CULTURE RESULTS: SIGNIFICANT CHANGE UP
CULTURE RESULTS: SIGNIFICANT CHANGE UP
EOSINOPHIL # BLD AUTO: 0.27 K/UL — SIGNIFICANT CHANGE UP (ref 0–0.5)
EOSINOPHIL NFR BLD AUTO: 2.3 % — SIGNIFICANT CHANGE UP (ref 0–6)
GLUCOSE SERPL-MCNC: 109 MG/DL — HIGH (ref 70–99)
HCT VFR BLD CALC: 35.1 % — SIGNIFICANT CHANGE UP (ref 34.5–45)
HGB BLD-MCNC: 11.3 G/DL — LOW (ref 11.5–15.5)
IMM GRANULOCYTES NFR BLD AUTO: 0.6 % — SIGNIFICANT CHANGE UP (ref 0–1.5)
LYMPHOCYTES # BLD AUTO: 18.9 % — SIGNIFICANT CHANGE UP (ref 13–44)
LYMPHOCYTES # BLD AUTO: 2.2 K/UL — SIGNIFICANT CHANGE UP (ref 1–3.3)
MAGNESIUM SERPL-MCNC: 2.2 MG/DL — SIGNIFICANT CHANGE UP (ref 1.6–2.6)
MCHC RBC-ENTMCNC: 29.7 PG — SIGNIFICANT CHANGE UP (ref 27–34)
MCHC RBC-ENTMCNC: 32.2 GM/DL — SIGNIFICANT CHANGE UP (ref 32–36)
MCV RBC AUTO: 92.4 FL — SIGNIFICANT CHANGE UP (ref 80–100)
METHOD TYPE: SIGNIFICANT CHANGE UP
MONOCYTES # BLD AUTO: 1.17 K/UL — HIGH (ref 0–0.9)
MONOCYTES NFR BLD AUTO: 10 % — SIGNIFICANT CHANGE UP (ref 2–14)
NEUTROPHILS # BLD AUTO: 7.89 K/UL — HIGH (ref 1.8–7.4)
NEUTROPHILS NFR BLD AUTO: 67.6 % — SIGNIFICANT CHANGE UP (ref 43–77)
NRBC # BLD: 0 /100 WBCS — SIGNIFICANT CHANGE UP (ref 0–0)
ORGANISM # SPEC MICROSCOPIC CNT: SIGNIFICANT CHANGE UP
ORGANISM # SPEC MICROSCOPIC CNT: SIGNIFICANT CHANGE UP
PHOSPHATE SERPL-MCNC: 2.8 MG/DL — SIGNIFICANT CHANGE UP (ref 2.5–4.5)
PLATELET # BLD AUTO: 231 K/UL — SIGNIFICANT CHANGE UP (ref 150–400)
POTASSIUM SERPL-MCNC: 3.6 MMOL/L — SIGNIFICANT CHANGE UP (ref 3.5–5.3)
POTASSIUM SERPL-SCNC: 3.6 MMOL/L — SIGNIFICANT CHANGE UP (ref 3.5–5.3)
PROT SERPL-MCNC: 7.3 G/DL — SIGNIFICANT CHANGE UP (ref 6–8.3)
RBC # BLD: 3.8 M/UL — SIGNIFICANT CHANGE UP (ref 3.8–5.2)
RBC # FLD: 15.1 % — HIGH (ref 10.3–14.5)
SODIUM SERPL-SCNC: 139 MMOL/L — SIGNIFICANT CHANGE UP (ref 135–145)
SPECIMEN SOURCE: SIGNIFICANT CHANGE UP
SPECIMEN SOURCE: SIGNIFICANT CHANGE UP
WBC # BLD: 11.67 K/UL — HIGH (ref 3.8–10.5)
WBC # FLD AUTO: 11.67 K/UL — HIGH (ref 3.8–10.5)

## 2021-04-16 PROCEDURE — 99233 SBSQ HOSP IP/OBS HIGH 50: CPT | Mod: GC

## 2021-04-16 RX ORDER — ACETYLCYSTEINE 200 MG/ML
1200 VIAL (ML) MISCELLANEOUS EVERY 12 HOURS
Refills: 0 | Status: DISCONTINUED | OUTPATIENT
Start: 2021-04-16 | End: 2021-04-16

## 2021-04-16 RX ORDER — SODIUM CHLORIDE 9 MG/ML
1000 INJECTION INTRAMUSCULAR; INTRAVENOUS; SUBCUTANEOUS
Refills: 0 | Status: DISCONTINUED | OUTPATIENT
Start: 2021-04-16 | End: 2021-04-19

## 2021-04-16 RX ADMIN — Medication 1200 MILLIGRAM(S): at 12:03

## 2021-04-16 RX ADMIN — CEFTRIAXONE 100 MILLIGRAM(S): 500 INJECTION, POWDER, FOR SOLUTION INTRAMUSCULAR; INTRAVENOUS at 23:45

## 2021-04-16 RX ADMIN — CEFTRIAXONE 100 MILLIGRAM(S): 500 INJECTION, POWDER, FOR SOLUTION INTRAMUSCULAR; INTRAVENOUS at 00:30

## 2021-04-16 RX ADMIN — SODIUM CHLORIDE 125 MILLILITER(S): 9 INJECTION INTRAMUSCULAR; INTRAVENOUS; SUBCUTANEOUS at 12:06

## 2021-04-16 RX ADMIN — HEPARIN SODIUM 2400 UNIT(S)/HR: 5000 INJECTION INTRAVENOUS; SUBCUTANEOUS at 07:57

## 2021-04-16 NOTE — PROGRESS NOTE ADULT - SUBJECTIVE AND OBJECTIVE BOX
65y Female is under our care for     REVIEW OF SYSTEMS:  [  ] Not able to illicit  General:	  Chest:	  GI:	  :  Skin:	  Musculoskeletal:	  Neuro:	    MEDS:  cefTRIAXone   IVPB 1000 milliGRAM(s) IV Intermittent every 24 hours    ALLERGIES: Allergies    No Known Allergies    Intolerances        VITALS:  Vital Signs Last 24 Hrs  T(C): 36.7 (16 Apr 2021 11:18), Max: 36.9 (16 Apr 2021 07:27)  T(F): 98 (16 Apr 2021 11:18), Max: 98.5 (16 Apr 2021 07:27)  HR: 96 (16 Apr 2021 11:18) (95 - 108)  BP: 133/64 (16 Apr 2021 11:18) (114/81 - 162/85)  BP(mean): --  RR: 18 (16 Apr 2021 11:18) (17 - 18)  SpO2: 95% (16 Apr 2021 11:18) (90% - 95%)      PHYSICAL EXAM:  HEENT:  Neck:  Respiratory:  Cardiovascular:  Gastrointestinal:  Extremities:  Skin:  Ortho:  Neuro:    LABS/DIAGNOSTIC TESTS:                        11.3   11.67 )-----------( 231      ( 16 Apr 2021 07:15 )             35.1     WBC Count: 11.67 K/uL (04-16 @ 07:15)  WBC Count: 12.55 K/uL (04-15 @ 06:26)  WBC Count: 17.41 K/uL (04-14 @ 16:21)  WBC Count: 23.08 K/uL (04-14 @ 09:43)  WBC Count: 26.71 K/uL (04-14 @ 05:37)    04-16    139  |  104  |  26<H>  ----------------------------<  109<H>  3.6   |  26  |  1.35<H>    Ca    8.5      16 Apr 2021 07:15  Phos  2.8     04-16  Mg     2.2     04-16    TPro  7.3  /  Alb  2.3<L>  /  TBili  0.4  /  DBili  x   /  AST  81<H>  /  ALT  40  /  AlkPhos  163<H>  04-16      CULTURES:   .Urine Clean Catch (Midstream)  04-14 @ 21:48   No growth  --  --      .Blood Blood-Peripheral  04-14 @ 02:59   Growth in aerobic and anaerobic bottles: Proteus mirabilis  ***Blood Panel PCR results on this specimen are available  approximately 3 hours after the Gram stain result.***  Gram stain, PCR, and/or culture results may not always  correspond due to difference in methodologies.  ************************************************************  This PCR assay was performed by multiplex PCR. This  Assay tests for 66 bacterial and resistance gene targets.  Please refer to the [employer] test directory  at [email or web address] for details.  --  Blood Culture PCR  Proteus mirabilis        RADIOLOGY:    < from: US Duplex Venous Lower Ext Complete, Bilateral (04.15.21 @ 13:10) >    EXAM:  US DPLX LWR EXT VEINS COMPL BI                            PROCEDURE DATE:  04/15/2021          INTERPRETATION:  CLINICAL INFORMATION: Sepsis    COMPARISON: None available.    TECHNIQUE: Duplex sonography of the BILATERAL LOWER extremity veins with color and spectral Doppler, with and without compression.    FINDINGS:    RIGHT:  Normal compressibility of the RIGHT common femoral, femoral and popliteal veins.  Doppler examination shows normal spontaneous and phasic flow.  No RIGHT calf vein thrombosis is detected.    LEFT:  Normal compressibility of the LEFT common femoral, femoral and popliteal veins.  Doppler examination shows normal spontaneous and phasic flow.  No LEFT calf vein thrombosis is detected.    IMPRESSION:  No evidence of deep venous thrombosis in either lower extremity.        < end of copied text >   65y Female is under our care for UTI and bacteremia.  Patient was seen laying comfortably in bed with no acute distress.  Patients urine cultures were negative and blood cultures positive for proteus, repeat blood cultures sent this morning.  Patient is agreeable for CTA as BUN and cr has decreased.  She remains afebrile and WBC count is trending down.    REVIEW OF SYSTEMS:  [  ] Not able to illicit  General: no fevers no malaise,   Chest: no cough, sob + upon exertion, no CP  GI: no nvd no abdominal pain  : urinary incontinence and urinary frequency.  Skin: bilateral leg scabs  Musculoskeletal: no trauma no LBP  Neuro: no ha's no dizziness     MEDS:  cefTRIAXone   IVPB 1000 milliGRAM(s) IV Intermittent every 24 hours    ALLERGIES: Allergies    No Known Allergies    Intolerances        VITALS:  Vital Signs Last 24 Hrs  T(C): 36.7 (16 Apr 2021 11:18), Max: 36.9 (16 Apr 2021 07:27)  T(F): 98 (16 Apr 2021 11:18), Max: 98.5 (16 Apr 2021 07:27)  HR: 96 (16 Apr 2021 11:18) (95 - 108)  BP: 133/64 (16 Apr 2021 11:18) (114/81 - 162/85)  BP(mean): --  RR: 18 (16 Apr 2021 11:18) (17 - 18)  SpO2: 95% (16 Apr 2021 11:18) (90% - 95%)      PHYSICAL EXAM:  HEENT: normocephalic with moist oral mucosa  Neck: supple no LN's no JVD  Respiratory: lungs clear no rales no rhonchi  Cardiovascular: S1 S2 reg no murmurs  Gastrointestinal: +BS with soft, nondistended abdomen; nontender, obese  Extremities: no edema no cyanosis  Skin: Bilateral leg scabs from scratching  Ortho: no jt swelling  Neuro: AAO x 4    LABS/DIAGNOSTIC TESTS:                        11.3   11.67 )-----------( 231      ( 16 Apr 2021 07:15 )             35.1     WBC Count: 11.67 K/uL (04-16 @ 07:15)  WBC Count: 12.55 K/uL (04-15 @ 06:26)  WBC Count: 17.41 K/uL (04-14 @ 16:21)  WBC Count: 23.08 K/uL (04-14 @ 09:43)  WBC Count: 26.71 K/uL (04-14 @ 05:37)    04-16    139  |  104  |  26<H>  ----------------------------<  109<H>  3.6   |  26  |  1.35<H>    Ca    8.5      16 Apr 2021 07:15  Phos  2.8     04-16  Mg     2.2     04-16    TPro  7.3  /  Alb  2.3<L>  /  TBili  0.4  /  DBili  x   /  AST  81<H>  /  ALT  40  /  AlkPhos  163<H>  04-16      CULTURES:   .Urine Clean Catch (Midstream)  04-14 @ 21:48   No growth  --  --      .Blood Blood-Peripheral  04-14 @ 02:59   Growth in aerobic and anaerobic bottles: Proteus mirabilis  ***Blood Panel PCR results on this specimen are available  approximately 3 hours after the Gram stain result.***  Gram stain, PCR, and/or culture results may not always  correspond due to difference in methodologies.  ************************************************************  This PCR assay was performed by multiplex PCR. This  Assay tests for 66 bacterial and resistance gene targets.  Please refer to the [facility] [employer] test directory  at [email or web address] for details.  --  Blood Culture PCR  Proteus mirabilis        RADIOLOGY:    < from: US Duplex Venous Lower Ext Complete, Bilateral (04.15.21 @ 13:10) >    EXAM:  US DPLX LWR EXT VEINS COMPL BI                            PROCEDURE DATE:  04/15/2021          INTERPRETATION:  CLINICAL INFORMATION: Sepsis    COMPARISON: None available.    TECHNIQUE: Duplex sonography of the BILATERAL LOWER extremity veins with color and spectral Doppler, with and without compression.    FINDINGS:    RIGHT:  Normal compressibility of the RIGHT common femoral, femoral and popliteal veins.  Doppler examination shows normal spontaneous and phasic flow.  No RIGHT calf vein thrombosis is detected.    LEFT:  Normal compressibility of the LEFT common femoral, femoral and popliteal veins.  Doppler examination shows normal spontaneous and phasic flow.  No LEFT calf vein thrombosis is detected.    IMPRESSION:  No evidence of deep venous thrombosis in either lower extremity.        < end of copied text >

## 2021-04-16 NOTE — PROGRESS NOTE ADULT - SUBJECTIVE AND OBJECTIVE BOX
PGY-1 Progress Note discussed with attending    PAGER #: [[phone]] TILL 5:00 PM  PLEASE CONTACT ON CALL TEAM:  - On Call Team (Please refer to Ayan) FROM 5:00 PM - 8:30PM  - Nightfloat Team FROM 8:30 -7:30 AM    INTERVAL HPI  -     OVERNIGHT EVENTS:   -     REVIEW OF SYSTEMS:  CONSTITUTIONAL: No fever, weight loss, or fatigue  RESPIRATORY: No cough, wheezing, chills or hemoptysis; No shortness of breath  CARDIOVASCULAR: No chest pain, palpitations, dizziness, or leg swelling  GASTROINTESTINAL: No abdominal pain. No nausea, vomiting, or hematemesis; No diarrhea or constipation. No melena or hematochezia.  GENITOURINARY: No dysuria or hematuria, urinary frequency  NEUROLOGICAL: No headaches, memory loss, loss of strength, numbness, or tremors  SKIN: No itching, burning, rashes, or lesions     MEDICATIONS  (STANDING):  acetylcysteine  Oral Solution 1200 milliGRAM(s) Oral every 12 hours  cefTRIAXone   IVPB 1000 milliGRAM(s) IV Intermittent every 24 hours  heparin  Infusion.  Unit(s)/Hr (24 mL/Hr) IV Continuous <Continuous>  lactated ringers. 1000 milliLiter(s) (125 mL/Hr) IV Continuous <Continuous>  sodium chloride 0.9%. 1000 milliLiter(s) (125 mL/Hr) IV Continuous <Continuous>    MEDICATIONS  (PRN):  heparin   Injectable 02060 Unit(s) IV Push every 6 hours PRN For aPTT less than 40  heparin   Injectable 5000 Unit(s) IV Push every 6 hours PRN For aPTT between 40 - 57      Vital Signs Last 24 Hrs  T(C): 36.7 (16 Apr 2021 11:18), Max: 36.9 (16 Apr 2021 07:27)  T(F): 98 (16 Apr 2021 11:18), Max: 98.5 (16 Apr 2021 07:27)  HR: 96 (16 Apr 2021 11:18) (95 - 108)  BP: 133/64 (16 Apr 2021 11:18) (114/81 - 162/85)  BP(mean): --  RR: 18 (16 Apr 2021 11:18) (17 - 18)  SpO2: 95% (16 Apr 2021 11:18) (90% - 95%)    PHYSICAL EXAMINATION:  GENERAL: NAD, AAOx  HEAD: AT/NC  EYES: conjunctiva and sclera clear  NECK: supple, No JVD noted, Normal thyroid  CHEST/LUNG: CTABL; no rales, rhonchi, wheezing, or rubs  HEART: regular rate and rhythm; no murmurs, rubs, or gallops  ABDOMEN: soft, nontender, nondistended; Bowel sounds present  EXTREMITIES:  2+ Peripheral Pulses, No clubbing, cyanosis, or edema  SKIN: warm dry                          11.3   11.67 )-----------( 231      ( 16 Apr 2021 07:15 )             35.1     04-16    139  |  104  |  26<H>  ----------------------------<  109<H>  3.6   |  26  |  1.35<H>    Ca    8.5      16 Apr 2021 07:15  Phos  2.8     04-16  Mg     2.2     04-16    TPro  7.3  /  Alb  2.3<L>  /  TBili  0.4  /  DBili  x   /  AST  81<H>  /  ALT  40  /  AlkPhos  163<H>  04-16    LIVER FUNCTIONS - ( 16 Apr 2021 07:15 )  Alb: 2.3 g/dL / Pro: 7.3 g/dL / ALK PHOS: 163 U/L / ALT: 40 U/L DA / AST: 81 U/L / GGT: x           CARDIAC MARKERS ( 14 Apr 2021 16:22 )  3.130 ng/mL / x     / x     / x     / x          PTT - ( 16 Apr 2021 07:15 )  PTT:76.8 sec  COVID-19 PCR: NotDetec (14 Apr 2021 18:55)  SARS-CoV-2: NotDetec (13 Apr 2021 23:06)      CAPILLARY BLOOD GLUCOSE          RADIOLOGY & ADDITIONAL TESTS:                   PGY-1 Progress Note discussed with attending    PAGER #: [[phone]] TILL 5:00 PM  PLEASE CONTACT ON CALL TEAM:  - On Call Team (Please refer to Ayan) FROM 5:00 PM - 8:30PM  - Nightfloat Team FROM 8:30 -7:30 AM    OVERNIGHT EVENTS:   - no acute events overnight. patient is resting comfortably in bed. Spoke with her brother, Cardiologist. They are agreeable for CTA chest, but she cannot tolerate the procedure 2/2 weight.    REVIEW OF SYSTEMS:  CONSTITUTIONAL: No fever, weight loss, or fatigue  RESPIRATORY: No cough, wheezing, chills or hemoptysis; No shortness of breath  CARDIOVASCULAR: No chest pain, palpitations, dizziness, or leg swelling  GASTROINTESTINAL: No abdominal pain. No nausea, vomiting, or hematemesis; No diarrhea or constipation. No melena or hematochezia.  GENITOURINARY: No dysuria or hematuria, urinary frequency  NEUROLOGICAL: No headaches, memory loss, loss of strength, numbness, or tremors  SKIN: No itching, burning, rashes, or lesions     MEDICATIONS  (STANDING):  acetylcysteine  Oral Solution 1200 milliGRAM(s) Oral every 12 hours  cefTRIAXone   IVPB 1000 milliGRAM(s) IV Intermittent every 24 hours  heparin  Infusion.  Unit(s)/Hr (24 mL/Hr) IV Continuous <Continuous>  lactated ringers. 1000 milliLiter(s) (125 mL/Hr) IV Continuous <Continuous>  sodium chloride 0.9%. 1000 milliLiter(s) (125 mL/Hr) IV Continuous <Continuous>    MEDICATIONS  (PRN):  heparin   Injectable 58015 Unit(s) IV Push every 6 hours PRN For aPTT less than 40  heparin   Injectable 5000 Unit(s) IV Push every 6 hours PRN For aPTT between 40 - 57      Vital Signs Last 24 Hrs  T(C): 36.7 (16 Apr 2021 11:18), Max: 36.9 (16 Apr 2021 07:27)  T(F): 98 (16 Apr 2021 11:18), Max: 98.5 (16 Apr 2021 07:27)  HR: 96 (16 Apr 2021 11:18) (95 - 108)  BP: 133/64 (16 Apr 2021 11:18) (114/81 - 162/85)  BP(mean): --  RR: 18 (16 Apr 2021 11:18) (17 - 18)  SpO2: 95% (16 Apr 2021 11:18) (90% - 95%)    PHYSICAL EXAMINATION:  GENERAL: NAD, AAOx3  HEAD: AT/NC  EYES: conjunctiva and sclera clear  NECK: supple, No JVD noted, Normal thyroid  CHEST/LUNG: CTABL; no rales, rhonchi, wheezing, or rubs  HEART: regular rate and rhythm; no murmurs, rubs, or gallops  ABDOMEN: soft, nontender, nondistended; Bowel sounds present  EXTREMITIES:  2+ Peripheral Pulses, No clubbing, cyanosis, or edema  SKIN: warm dry                          11.3   11.67 )-----------( 231      ( 16 Apr 2021 07:15 )             35.1     04-16    139  |  104  |  26<H>  ----------------------------<  109<H>  3.6   |  26  |  1.35<H>    Ca    8.5      16 Apr 2021 07:15  Phos  2.8     04-16  Mg     2.2     04-16    TPro  7.3  /  Alb  2.3<L>  /  TBili  0.4  /  DBili  x   /  AST  81<H>  /  ALT  40  /  AlkPhos  163<H>  04-16    LIVER FUNCTIONS - ( 16 Apr 2021 07:15 )  Alb: 2.3 g/dL / Pro: 7.3 g/dL / ALK PHOS: 163 U/L / ALT: 40 U/L DA / AST: 81 U/L / GGT: x           CARDIAC MARKERS ( 14 Apr 2021 16:22 )  3.130 ng/mL / x     / x     / x     / x          PTT - ( 16 Apr 2021 07:15 )  PTT:76.8 sec  COVID-19 PCR: NotDetec (14 Apr 2021 18:55)  SARS-CoV-2: NotDetec (13 Apr 2021 23:06)      CAPILLARY BLOOD GLUCOSE          RADIOLOGY & ADDITIONAL TESTS:

## 2021-04-16 NOTE — PROGRESS NOTE ADULT - ASSESSMENT
Patient is a 64yo Morbidly obese Female with HLD s/p cholecystectomy p/w chills and SOB. Pt a/w Sepsis 2/2 UTI, concerns for PE/ high suspicion for COVID and RODRI. Now with GNR Bacteremia. Nephrology consulted for Elevated serum creatinine.    1. RODRI- in the setting of sepsis. Renal function overall improving s/p  IVF. TTE with normal EF.  FeNa 0.24%. Pt for CTA chest to r/o PE today. Discussed risk of NAZARIO with patient. Will give Mucomyst 1200 mg PO bid x 4 doses and NS @ 125cc/hr (12 hrs pre and 12 hrs post contrast) to minimize risk of contrast induced nephropathy.   Pt has not seen a physician in >3 years. Spoke with PMD's office last SCr 1.08 (eGFR on 55ml/min) on 7/6/2018. UA active likely in the setting of UTI rather than GN.   Spot Upr/Cr 1.98; however inaccurate in the setting of infection. Recc to repeat spot UPr/Cr after infection cleared.   Renal US with no right hydro(did not visualize left kidney). Strict I/Os. Avoid nephrotoxins/ NSAIDs/ RCA. Monitor BMP.  2. Sepsis 2/2 UTI/ Proteus Mirabilis bacteremia-  Pt on Ceftriaxone. UCx NG, however collected after antibiotics intiated.  ID following  3. LE edema- Resolved. No LE DVT seen on dopplers. TTE with normal EF. Avoid diuretics at this time. c/w low salt diet.   4. Hypokalemia- resolved s/p repletion.  Monitor lytes.     [facility] NEPHROLOGY  Deandre Morrow M.D.  Matthew Walls D.O.  Mima Baker M.D.  Nadia Vang, MSN, ANP-C  [phone]    [address] [place] [address]

## 2021-04-16 NOTE — PROGRESS NOTE ADULT - PROBLEM SELECTOR PLAN 2
- p/w spO2 91% on RA, tachy 140s, D-dimer 5600  - LE doppler no DVT  - echo no right heart strain  - c/w empiric heparin ggt  - no CTA 2/2 weight limit  - no V/Q 2/2 weight limit  - possible stress test over the weekend

## 2021-04-16 NOTE — PROGRESS NOTE ADULT - SUBJECTIVE AND OBJECTIVE BOX
[facility] NEPHROLOGY- PROGRESS NOTE    Patient is a 64yo Morbidly obese Female with HLD s/p cholecystectomy p/w chills and SOB. Pt a/w Sepsis 2/2 UTI, concerns for PE/ high suspicion for COVID and RODRI. Now with GNR Bacteremia. Nephrology consulted for Elevated serum creatinine.    Hospital Medications: Medications reviewed.  REVIEW OF SYSTEMS:  CONSTITUTIONAL: No fevers or chills  RESPIRATORY: No shortness of breath at rest +SNYDER  CARDIOVASCULAR: No chest pain.  GASTROINTESTINAL: No nausea, vomiting, diarrhea or abdominal pain.   VASCULAR: No bilateral lower extremity edema.     VITALS:  T(F): 98 (21 @ 11:18), Max: 98.5 (21 @ 07:27)  HR: 96 (21 @ 11:18)  BP: 133/64 (21 @ 11:18)  RR: 18 (21 @ 11:18)  SpO2: 95% (21 @ 11:18)  Wt(kg): --    04-15 @ 07:01  -   @ 07:00  --------------------------------------------------------  IN: 230 mL / OUT: 0 mL / NET: 230 mL      PHYSICAL EXAM:  Gen: NAD, morbidly obese  Cards: RRR, +S1/S2, no M/G/R  Resp: CTA B/L  GI: soft, NT/ND, +abd hernia  : no CVA tenderness  Extremities: No LE edema B/L  Derm: small excoriation scabs      LABS:      139  |  104  |  26<H>  ----------------------------<  109<H>  3.6   |  26  |  1.35<H>    Ca    8.5      2021 07:15  Phos  2.8       Mg     2.2         TPro  7.3  /  Alb  2.3<L>  /  TBili  0.4  /  DBili      /  AST  81<H>  /  ALT  40  /  AlkPhos  163<H>  04-16    Creatinine Trend: 1.35 <--, 1.71 <--, 2.83 <--, 2.70 <--                        11.3   11.67 )-----------( 231      ( 2021 07:15 )             35.1     Urine Studies:  Urinalysis Basic - ( 2021 03:33 )    Color: Red / Appearance: Turbid / S.015 / pH:   Gluc:  / Ketone: Trace  / Bili: Negative / Urobili: 1   Blood:  / Protein: 500 mg/dL / Nitrite: Positive   Leuk Esterase: Moderate / RBC: >50 /HPF / WBC 26-50 /HPF   Sq Epi:  / Non Sq Epi: Few /HPF / Bacteria: Many /HPF      Sodium, Random Urine: 35 mmol/L ( @ 03:32)  Creatinine, Random Urine: 301 mg/dL ( @ 03:32)  Osmolality, Random Urine: 481 mos/kg ( @ 03:32)

## 2021-04-16 NOTE — CHART NOTE - NSCHARTNOTEFT_GEN_A_CORE
Spoke with the patient and her Brother, Jonathan (#[phone]), separately, in regards to CTA chest w/ contrast, and it's benefit and risk. Patient has had elevated Creatinine level, which trended down, but remains to be at risk. They understands the risk and benefit of receiving CTA chest and consents for the procedure.

## 2021-04-16 NOTE — PROGRESS NOTE ADULT - ASSESSMENT
Patient is a 65 year old Female, from home, morbid obese, with PMH of HTN (not on any meds) and PSH of cholecystectomy, presented with shivering and chills for 4 day, associated with urinary frequency. She was admitted to [facility] for sepsis likely 2/2/ UTI and suspected PE.

## 2021-04-16 NOTE — PROGRESS NOTE ADULT - PROBLEM SELECTOR PLAN 5
- p/w Cr 2.7; unknown baseline  - likely prerenal 2/2 poor PO intake  - 2.7>>1.7>1.3  - FeNa 0.2%   - c/w IVF   - monitor BMP

## 2021-04-16 NOTE — PROGRESS NOTE ADULT - ASSESSMENT
UTI   Bacteremia - proteus mirabilis  Leukocytosis - improving    Plan: Continue Rocephin 1g iv daily     UTI   Bacteremia - proteus mirabilis  Leukocytosis - improving    Plan: Continue Rocephin 1g iv daily    I agree with above

## 2021-04-16 NOTE — PROGRESS NOTE ADULT - PROBLEM SELECTOR PLAN 7
- trended down    - p/w trop 1.970>4.3>3.1  - likely 2/2 PE  - TTE no right heart strain noted    - Cardio, Dr. Adame, onboard

## 2021-04-16 NOTE — PROGRESS NOTE ADULT - PROBLEM SELECTOR PLAN 1
- p/w shievering anf fever, associated w/ urinary urgency & frequency  - admission: temp of 99.9, , WBC 11K, lactate 2.5,  /73   - Likely 2/2 UTI   - UA pos  - Bcx proteus mirabilis  - Ucx neg  - s/p vanco x1, rocephin x1, 1L NS bolus in ED    - c/w rocephin 1g q24h, IVF    - ID, Dr. Long, [place]

## 2021-04-17 LAB
ALBUMIN SERPL ELPH-MCNC: 2.2 G/DL — LOW (ref 3.5–5)
ALP SERPL-CCNC: 207 U/L — HIGH (ref 40–120)
ALT FLD-CCNC: 39 U/L DA — SIGNIFICANT CHANGE UP (ref 10–60)
ANION GAP SERPL CALC-SCNC: 6 MMOL/L — SIGNIFICANT CHANGE UP (ref 5–17)
APTT BLD: 92.6 SEC — HIGH (ref 27.5–35.5)
AST SERPL-CCNC: 49 U/L — HIGH (ref 10–40)
BASOPHILS # BLD AUTO: 0.09 K/UL — SIGNIFICANT CHANGE UP (ref 0–0.2)
BASOPHILS NFR BLD AUTO: 0.7 % — SIGNIFICANT CHANGE UP (ref 0–2)
BILIRUB SERPL-MCNC: 0.4 MG/DL — SIGNIFICANT CHANGE UP (ref 0.2–1.2)
BUN SERPL-MCNC: 21 MG/DL — HIGH (ref 7–18)
CALCIUM SERPL-MCNC: 8.9 MG/DL — SIGNIFICANT CHANGE UP (ref 8.4–10.5)
CHLORIDE SERPL-SCNC: 104 MMOL/L — SIGNIFICANT CHANGE UP (ref 96–108)
CO2 SERPL-SCNC: 28 MMOL/L — SIGNIFICANT CHANGE UP (ref 22–31)
CREAT SERPL-MCNC: 1.1 MG/DL — SIGNIFICANT CHANGE UP (ref 0.5–1.3)
EOSINOPHIL # BLD AUTO: 0.42 K/UL — SIGNIFICANT CHANGE UP (ref 0–0.5)
EOSINOPHIL NFR BLD AUTO: 3.2 % — SIGNIFICANT CHANGE UP (ref 0–6)
GLUCOSE SERPL-MCNC: 114 MG/DL — HIGH (ref 70–99)
HCT VFR BLD CALC: 34.9 % — SIGNIFICANT CHANGE UP (ref 34.5–45)
HGB BLD-MCNC: 11.1 G/DL — LOW (ref 11.5–15.5)
IMM GRANULOCYTES NFR BLD AUTO: 1.1 % — SIGNIFICANT CHANGE UP (ref 0–1.5)
LYMPHOCYTES # BLD AUTO: 22.9 % — SIGNIFICANT CHANGE UP (ref 13–44)
LYMPHOCYTES # BLD AUTO: 3.04 K/UL — SIGNIFICANT CHANGE UP (ref 1–3.3)
MAGNESIUM SERPL-MCNC: 2 MG/DL — SIGNIFICANT CHANGE UP (ref 1.6–2.6)
MCHC RBC-ENTMCNC: 29.2 PG — SIGNIFICANT CHANGE UP (ref 27–34)
MCHC RBC-ENTMCNC: 31.8 GM/DL — LOW (ref 32–36)
MCV RBC AUTO: 91.8 FL — SIGNIFICANT CHANGE UP (ref 80–100)
MONOCYTES # BLD AUTO: 1.23 K/UL — HIGH (ref 0–0.9)
MONOCYTES NFR BLD AUTO: 9.3 % — SIGNIFICANT CHANGE UP (ref 2–14)
NEUTROPHILS # BLD AUTO: 8.34 K/UL — HIGH (ref 1.8–7.4)
NEUTROPHILS NFR BLD AUTO: 62.8 % — SIGNIFICANT CHANGE UP (ref 43–77)
NRBC # BLD: 0 /100 WBCS — SIGNIFICANT CHANGE UP (ref 0–0)
PHOSPHATE SERPL-MCNC: 3.1 MG/DL — SIGNIFICANT CHANGE UP (ref 2.5–4.5)
PLATELET # BLD AUTO: 238 K/UL — SIGNIFICANT CHANGE UP (ref 150–400)
POTASSIUM SERPL-MCNC: 3.6 MMOL/L — SIGNIFICANT CHANGE UP (ref 3.5–5.3)
POTASSIUM SERPL-SCNC: 3.6 MMOL/L — SIGNIFICANT CHANGE UP (ref 3.5–5.3)
PROT SERPL-MCNC: 7.1 G/DL — SIGNIFICANT CHANGE UP (ref 6–8.3)
RBC # BLD: 3.8 M/UL — SIGNIFICANT CHANGE UP (ref 3.8–5.2)
RBC # FLD: 14.9 % — HIGH (ref 10.3–14.5)
SODIUM SERPL-SCNC: 138 MMOL/L — SIGNIFICANT CHANGE UP (ref 135–145)
WBC # BLD: 13.27 K/UL — HIGH (ref 3.8–10.5)
WBC # FLD AUTO: 13.27 K/UL — HIGH (ref 3.8–10.5)

## 2021-04-17 PROCEDURE — 99233 SBSQ HOSP IP/OBS HIGH 50: CPT | Mod: GC

## 2021-04-17 RX ORDER — WARFARIN SODIUM 2.5 MG/1
10 TABLET ORAL ONCE
Refills: 0 | Status: DISCONTINUED | OUTPATIENT
Start: 2021-04-17 | End: 2021-04-17

## 2021-04-17 RX ORDER — LISINOPRIL 2.5 MG/1
2.5 TABLET ORAL DAILY
Refills: 0 | Status: DISCONTINUED | OUTPATIENT
Start: 2021-04-17 | End: 2021-04-19

## 2021-04-17 RX ADMIN — CEFTRIAXONE 100 MILLIGRAM(S): 500 INJECTION, POWDER, FOR SOLUTION INTRAMUSCULAR; INTRAVENOUS at 23:01

## 2021-04-17 RX ADMIN — HEPARIN SODIUM 2400 UNIT(S)/HR: 5000 INJECTION INTRAVENOUS; SUBCUTANEOUS at 08:27

## 2021-04-17 NOTE — DISCHARGE NOTE PROVIDER - CARE PROVIDER_API CALL
Ronnie Mcdonald)  Cardiology  [address] [place] [address]  Phone: [phone]  Fax: [phone]  Follow Up Time:

## 2021-04-17 NOTE — PROGRESS NOTE ADULT - ASSESSMENT
Patient is a 65 year old Female, from home, morbid obese, with PMH of HTN (not on any meds) and PSH of cholecystectomy, presented with shivering and chills for 4 day, associated with urinary frequency. She was admitted to [facility] for sepsis likely 2/2/ UTI and suspected PE.

## 2021-04-17 NOTE — DISCHARGE NOTE PROVIDER - NSDCCPCAREPLAN_GEN_ALL_CORE_FT
PRINCIPAL DISCHARGE DIAGNOSIS  Diagnosis: Sepsis  Assessment and Plan of Treatment: You presented with shievering and fever for 4 days, and admitted for sepsis and suspected pulmonary embolism.   - Urinalysis was positive, but subsequent urine culture was negative; likely due to urine culture drawn after initiation of antibiotic.  -Blood culture showed proteus mirabilis.   - you were empirically treated with Vancomycin and ceftriaxone.   - Ceftriaxone was given for total of 7 days.   - you will be discharged on ceftin for another 7 days.         SECONDARY DISCHARGE DIAGNOSES  Diagnosis: RODRI (acute kidney injury)  Assessment and Plan of Treatment: you were noted to have elevated creatinine level on admission, likely due to poor oral intake.   - you were treated with IVF, and your creatinine level trended down to normal      Diagnosis: Alkaline phosphatase elevation  Assessment and Plan of Treatment: You noted to have abnormal liver enzymes, likely due to bacteremia  - Hepatitis panel was negative  - your LFTs were monitored during the stay      Diagnosis: D-dimer, elevated  Assessment and Plan of Treatment: You presented with shortness of breath.  - D-dimer was 5605  - LE doppler was negative  - Echocardiogram showed no signs of right heart strain.   - CTA chest and V/Q scan was not performed due to patient's body habitus.   - you were empirically treated with heparin drip.   - you will be discharged on Eliquis loading and maintence       Diagnosis: Troponin level elevated  Assessment and Plan of Treatment: You were noted to have elevated troponin level, which trended down during the stay, likely due to suspected pulmonary embolism.     PRINCIPAL DISCHARGE DIAGNOSIS  Diagnosis: Sepsis  Assessment and Plan of Treatment: You presented with shievering and fever for 4 days, and admitted for sepsis and suspected pulmonary embolism.   - Urinalysis was positive, but subsequent urine culture was negative; likely due to urine culture drawn after initiation of antibiotic.  -Blood culture showed proteus mirabilis.   - you were empirically treated with Vancomycin and ceftriaxone.   - Ceftriaxone was given for total of 7 days.   - you will be discharged on ceftin for another 7 days.         SECONDARY DISCHARGE DIAGNOSES  Diagnosis: D-dimer, elevated  Assessment and Plan of Treatment: You presented with shortness of breath.  - D-dimer was 5605  - LE doppler was negative  - Echocardiogram showed no signs of right heart strain.   - CTA chest and V/Q scan was not performed due to patient's body habitus.   - you were empirically treated with heparin drip.   - you will be discharged on Eliquis 5mg two times a day. You will need to take this medication for 3 months. Follow up with cardiologist Dr. Mcdonald in 1-3 weeks and for evaluation and further refills of this medication      Diagnosis: Troponin level elevated  Assessment and Plan of Treatment: You were noted to have elevated troponin level, which trended down during the stay, likely due to suspected pulmonary embolism.    Diagnosis: Alkaline phosphatase elevation  Assessment and Plan of Treatment: You noted to have abnormal liver enzymes, likely due to bacteremia  - Hepatitis panel was negative  - your LFTs were monitored during the stay      Diagnosis: RODRI (acute kidney injury)  Assessment and Plan of Treatment: you were noted to have elevated creatinine level on admission, likely due to poor oral intake.   - you were treated with IVF, and your creatinine level trended down to normal       PRINCIPAL DISCHARGE DIAGNOSIS  Diagnosis: Sepsis  Assessment and Plan of Treatment: You presented with shievering and fever for 4 days, and admitted for sepsis and suspected pulmonary embolism.   - Urinalysis was positive, but subsequent urine culture was negative; likely due to urine culture drawn after initiation of antibiotic.  -Blood culture showed proteus mirabilis.   - you were empirically treated with Vancomycin and ceftriaxone.   - Ceftriaxone was given for total of 7 days.   - you will be discharged on ceftin for another 7 days.   Follow up with your PCP in 1-2 weeks         SECONDARY DISCHARGE DIAGNOSES  Diagnosis: D-dimer, elevated  Assessment and Plan of Treatment: You presented with shortness of breath.  - D-dimer was 5605  - LE doppler was negative  - Echocardiogram showed no signs of right heart strain.   - CTA chest and V/Q scan was not performed due to patient's body habitus.   - you were empirically treated with heparin drip.   - you will be discharged on Eliquis 5mg two times a day. You will need to take this medication for 3 months. Follow up with cardiologist Dr. Mcdonald in 1-3 weeks and for evaluation and further refills of this medication      Diagnosis: Troponin level elevated  Assessment and Plan of Treatment: You were noted to have elevated troponin level, which trended down during the stay, likely due to suspected pulmonary embolism.    Diagnosis: Alkaline phosphatase elevation  Assessment and Plan of Treatment: You noted to have abnormal liver enzymes, likely due to bacteremia  - Hepatitis panel was negative  - your LFTs were monitored during the stay      Diagnosis: RODRI (acute kidney injury)  Assessment and Plan of Treatment: you were noted to have elevated creatinine level on admission, likely due to poor oral intake.   - you were treated with IVF, and your creatinine level trended down to normal      Diagnosis: Hypertension  Assessment and Plan of Treatment: You were started on a medication called lisinopril.   - You blood pressure should be within 120-140/80-90.  - You should follow-up with your PCP within 1 week of your discharge for routine blood pressure monitoring at your next visit.  - You should maintain healthy lifestyle by eating healthy low salt diet, avoid fatty food, weight loss, exercise regularly as tolerated 30 mins X 3 time per week.       PRINCIPAL DISCHARGE DIAGNOSIS  Diagnosis: Sepsis  Assessment and Plan of Treatment: You presented with shievering and fever for 4 days, and admitted for sepsis and suspected pulmonary embolism.   - Urinalysis was positive, but subsequent urine culture was negative; likely due to urine culture drawn after initiation of antibiotic.  -Blood culture showed proteus mirabilis.   - you were empirically treated with Vancomycin and ceftriaxone.   - Ceftriaxone was given for total of 7 days.   - you will be discharged on ceftin for another 7 days.   Follow up with your PCP in 1-2 weeks         SECONDARY DISCHARGE DIAGNOSES  Diagnosis: D-dimer, elevated  Assessment and Plan of Treatment: You presented with shortness of breath.  - D-dimer was 5605  - LE doppler was negative  - Echocardiogram showed no signs of right heart strain.   - CTA chest and V/Q scan was not performed due to patient's body habitus.   - you were empirically treated with heparin drip.   - you will be discharged on Eliquis 5mg two times a day. You will need to take this medication for 3 months. Follow up with cardiologist Dr. Mcdonald in 1-3 weeks and for evaluation and further refills of this medication      Diagnosis: Troponin level elevated  Assessment and Plan of Treatment: You were noted to have elevated troponin level, which trended down during the stay, likely due to suspected pulmonary embolism.    Diagnosis: Alkaline phosphatase elevation  Assessment and Plan of Treatment: You noted to have abnormal liver enzymes, likely due to bacteremia  - Hepatitis panel was negative  - your LFTs were monitored during the stay  - Follow up with your PCP for an abdominal US      Diagnosis: RODRI (acute kidney injury)  Assessment and Plan of Treatment: you were noted to have elevated creatinine level on admission, likely due to poor oral intake.   - you were treated with IVF, and your creatinine level trended down to normal      Diagnosis: Hypertension  Assessment and Plan of Treatment: Continue to monitor your blood pressure daily and make a chart. Take the chart to your PCP and cardiologist in 1-2 weeks.   - You should maintain healthy lifestyle by eating healthy low salt diet, avoid fatty food, weight loss, exercise regularly as tolerated 30 mins X 3 time per week.       PRINCIPAL DISCHARGE DIAGNOSIS  Diagnosis: Sepsis  Assessment and Plan of Treatment: You presented with shievering and fever for 4 days, and admitted for sepsis and suspected pulmonary embolism.   - Urinalysis was positive, but subsequent urine culture was negative; likely due to urine culture drawn after initiation of antibiotic.  -Blood culture showed proteus mirabilis.   - you were empirically treated with Vancomycin and ceftriaxone.   - Ceftriaxone was given for total of 7 days.   - you will be discharged on ceftin for another 7 days.   Follow up with your PCP in 1-2 weeks         SECONDARY DISCHARGE DIAGNOSES  Diagnosis: Gram-negative bacteremia  Assessment and Plan of Treatment: Found to have proteus bacteremia in the setting of presumed UTI. Urine cx negative due to collection timing post antibiotic administration. As above to complete an additional 7 days of oral antibiotics.    Diagnosis: Pulmonary embolism  Assessment and Plan of Treatment: There was concern for presumed pulmonary embolism in the setting of hypoxia to 70s, sob, d dimer >5000. CT angio/V/Q scan not able to be obtained due to weight restrictions. Dopplers were negative for DVT and Echo was negative for right heart strain. Case reviewed with cardiology who advised empiric management eliquis on discharge    Diagnosis: D-dimer, elevated  Assessment and Plan of Treatment: You presented with shortness of breath.  - D-dimer was 5605  - LE doppler was negative  - Echocardiogram showed no signs of right heart strain.   - CTA chest and V/Q scan was not performed due to patient's body habitus.   - you were empirically treated with heparin drip.   - you will be discharged on Eliquis 5mg two times a day. You will need to take this medication for 3 months. Follow up with cardiologist Dr. Mcdonald in 1-3 weeks and for evaluation and further refills of this medication      Diagnosis: Troponin level elevated  Assessment and Plan of Treatment: You were noted to have elevated troponin level, which trended down during the stay, likely due to suspected pulmonary embolism.    Diagnosis: Alkaline phosphatase elevation  Assessment and Plan of Treatment: You noted to have abnormal liver enzymes, likely due to bacteremia  - Hepatitis panel was negative  - your LFTs were monitored during the stay  - Follow up with your PCP for an abdominal US      Diagnosis: RODRI (acute kidney injury)  Assessment and Plan of Treatment: you were noted to have elevated creatinine level on admission, likely due to poor oral intake.   - you were treated with IVF, and your creatinine level trended down to normal      Diagnosis: Hypertension  Assessment and Plan of Treatment: Continue to monitor your blood pressure daily and make a chart. Take the chart to your PCP and cardiologist in 1-2 weeks.   - You should maintain healthy lifestyle by eating healthy low salt diet, avoid fatty food, weight loss, exercise regularly as tolerated 30 mins X 3 time per week.

## 2021-04-17 NOTE — PROGRESS NOTE ADULT - PROBLEM SELECTOR PLAN 6
- h/o HTN, on no meds at home  - monitor BP - resolved    - p/w Cr 2.7; unknown baseline  - likely prerenal 2/2 poor PO intake  - 2.7>>1.7>1.3>1.1  - FeNa 0.2%   - s/p IVF   - monitor BMP

## 2021-04-17 NOTE — PROGRESS NOTE ADULT - ATTENDING COMMENTS
sepsis w/ RODRI# NSTEMI  # Proteus bacteremia  -#Morbid obesity  -ID and renal consult appreciated- patient and brother agreed for the CTA- ordered  -fu cards recs  -cw iv atbx
normal...
64 yo F who presented with chills, found to have Proteus bacteremia 2/2 uti and also a PE. Pt is to cw Rocephin as per ID. Pt unable to have CTA due to body habitus and was started on heparin drip. Due to the high suspicion, will treat with Eliquis as per cardiology.
#sepsis 2/2 gram negative bacteremia W/ RODRI   #RODRI  # NSTEMI  #Possible PE    -tte-no rt heart strain, no dvt on us doppler, creatinine improving, no cp/sob. cw heparin gtt for now  -cards consult  -FU ID and renal recs

## 2021-04-17 NOTE — PROGRESS NOTE ADULT - SUBJECTIVE AND OBJECTIVE BOX
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 Pt states she has intermittent chills, urinary urgency and incontinent for 4 days. Pt denied chest pain, palpitation,  orthopnea, fever, vomiting, abdominal pain, diarrhea, constipation. Pt didn't visit PCP officially for few years, but never told that she has heart or kidney problem. Not on any meds at home. Pt has discoloration and scratched wounds on b/l LLE, she states she scratch as a habit.     in the ED;  Triage reported pox 91% on RA, now Ox 98% on 3L NC.  temp of 99.9, , code sepsis called. Initially 30cc/kg bolus held due to possible COVID viral cause of sepsis.  D-dimer 5600, heparin drip was started. cannot take CTA due to RODRI   (14 Apr 2021 02:03)      INTERIM EVENTS:Awake alert c/o polyuria no dyspnea       PMH -reviewed admission note, no change since admission  Heart Failure: Acute [ ] Chronic [ ] Acute on Chronic [ ] Diastolic [ ] Systolic [ ] Combined Systolic and Diastolic[ ]  RODRI[ ]  ATN[ ]  CKD I [ ] CKDII [ ] CKD III [ ] CKD IV [ ] CKD V [ ] ESRD[ ]  HTN[ ] CVA[ ] DM[ ] COPD[ ] COVID[ ] AF[ ]  PPM[ ] ICD[ ]    MEDICATIONS  (STANDING):  cefTRIAXone   IVPB 1000 milliGRAM(s) IV Intermittent every 24 hours  heparin  Infusion.  Unit(s)/Hr (24 mL/Hr) IV Continuous <Continuous>  lactated ringers. 1000 milliLiter(s) (125 mL/Hr) IV Continuous <Continuous>  sodium chloride 0.9%. 1000 milliLiter(s) (125 mL/Hr) IV Continuous <Continuous>  warfarin 10 milliGRAM(s) Oral once    MEDICATIONS  (PRN):  heparin   Injectable 92478 Unit(s) IV Push every 6 hours PRN For aPTT less than 40  heparin   Injectable 5000 Unit(s) IV Push every 6 hours PRN For aPTT between 40 - 57            REVIEW OF SYSTEMS:  Constitutional: [ ] fever, [ ]weight loss,  [ ]fatigue  Eyes: [ ] visual changes  Respiratory: [ ]shortness of breath;  [ ] cough, [ ]wheezing, [ ]chills, [ ]hemoptysis  Cardiovascular: [ ] chest pain, [ ]palpitations, [ ]dizziness,  [ ]leg swelling[ ]orthopnea[ ]PND  Gastrointestinal: [ ] abdominal pain, [ ]nausea, [ ]vomiting,  [ ]diarrhea [ ]Constipation [ ]Melena  Genitourinary: [ ] dysuria, [ ] hematuria [ ]Robison  Neurologic: [ ] headaches [ ] tremors[ ]weakness [ ]Paralysis Right[ ] Left[ ]  Skin: [ ] itching, [ ]burning, [ ] rashes  Endocrine: [ ] heat or cold intolerance  Musculoskeletal: [ ] joint pain or swelling; [ ] muscle, back, or extremity pain  Psychiatric: [ ] depression, [ ]anxiety, [ ]mood swings, or [ ]difficulty sleeping  Hematologic: [ ] easy bruising, [ ] bleeding gums    [x] All remaining systems negative except as per above.   [ ]Unable to obtain.    Vital Signs Last 24 Hrs  T(C): 36.6 (17 Apr 2021 11:13), Max: 36.7 (17 Apr 2021 04:51)  T(F): 97.8 (17 Apr 2021 11:13), Max: 98 (17 Apr 2021 04:51)  HR: 91 (17 Apr 2021 11:13) (91 - 104)  BP: 139/69 (17 Apr 2021 11:13) (135/65 - 160/89)  BP(mean): --  RR: 18 (17 Apr 2021 11:13) (18 - 18)  SpO2: 95% (17 Apr 2021 11:13) (93% - 97%)  I&O's Summary    16 Apr 2021 07:01  -  17 Apr 2021 07:00  --------------------------------------------------------  IN: 1360 mL / OUT: 0 mL / NET: 1360 mL        PHYSICAL EXAM:  General: No acute distress BMI-  HEENT: EOMI, PERRL  Neck: Supple, [ ] JVD  Lungs: Equal air entry bilaterally; [ ] rales [ ] wheezing [ ] rhonchi  Heart: Regular rate and rhythm; [ ] murmur   /6 [ ] systolic [ ] diastolic [ ] radiation[ ] rubs [ ]  gallops  Abdomen: Nontender, bowel sounds present  Extremities: No clubbing, cyanosis, [ ] edema [ ]Pulses  equal and intact  Nervous system:  Alert & Oriented X3, no focal deficits  Psychiatric: Normal affect  Skin: No rashes or lesions    LABS:  04-17    138  |  104  |  21<H>  ----------------------------<  114<H>  3.6   |  28  |  1.10    Ca    8.9      17 Apr 2021 06:15  Phos  3.1     04-17  Mg     2.0     04-17    TPro  7.1  /  Alb  2.2<L>  /  TBili  0.4  /  DBili  x   /  AST  49<H>  /  ALT  39  /  AlkPhos  207<H>  04-17    Creatinine Trend: 1.10<--, 1.35<--, 1.71<--, 2.83<--, 2.70<--                        11.1   13.27 )-----------( 238      ( 17 Apr 2021 06:15 )             34.9     PTT - ( 17 Apr 2021 06:15 )  PTT:92.6 sec        IMPRESSION AND PLAN:    Patient is a 65 year old Female, from home, morbid obese, with PMH of HTN (not on any meds) and PSH of cholecystectomy, presented with shivering and chills for 4 day, associated with urinary frequency. She was admitted to [facility] for sepsis likely 2/2/ UTI and suspected PE.        Problem/Plan - 1:  ·  Problem: UTI (urinary tract infection). Plan: - p/w shievering and fever, associated w/ urinary urgency & frequency  - admission: temp of 99.9, , WBC 11K, lactate 2.5,  /73   - Likely 2/2 UTI - UA pos  - Bcx proteus mirabilis  - Ucx neg  - s/p vanco x1, rocephin x1, 1L NS bolus in ED  - c/w rocephin 1g q24h (4 of 7)    [facility]    Problem/Plan - 2:  ·  Problem: Suspected pulmonary embolism.  Plan: - p/w spO2 91% on RA, tachy 140s, D-dimer 5600  - LE doppler no DVT  - echo no right heart strain  - c/w empiric heparin ggt  - no CTA 2/2 weight limit  - no V/Q 2/2 weight limit  - no plan for stress test  -Would start Eliquis 5mg BID

## 2021-04-17 NOTE — DISCHARGE NOTE PROVIDER - NSDCMRMEDTOKEN_GEN_ALL_CORE_FT
apixaban 5 mg oral tablet: 1 tab(s) orally 2 times a day  cefuroxime 500 mg oral tablet: 1 tab(s) orally 2 times a day   lisinopril 2.5 mg oral tablet: 1 tab(s) orally once a day

## 2021-04-17 NOTE — PROGRESS NOTE ADULT - TIME BILLING
- Review of records, telemetry, vital signs and daily labs.   - General and cardiovascular physical examination.  - Generation of cardiovascular treatment plan.  - Coordination of care.    Patient was seen and examined by me on 04/17/2021,interim events noted,labs and radiology studies reviewed.  Ronnie Mcdonald MD,FACC.  [address] [place], [place] [id number].  [phone]

## 2021-04-17 NOTE — PROGRESS NOTE ADULT - ASSESSMENT
Patient is a 64yo Morbidly obese Female with HLD s/p cholecystectomy p/w chills and SOB. Pt a/w Sepsis 2/2 UTI, concerns for PE/ high suspicion for COVID and RODRI. Now with GNR Bacteremia. Nephrology consulted for Elevated serum creatinine.    1. RODRI- in the setting of sepsis. Renal function continues to improve s/p  IVF. Pt to have CT with IV contrast with IVF pre- and post- contrast administration.  Monitor renal fxn following IV contrast.   TTE with normal EF.  FeNa 0.24%. Pt for CTA chest to r/o PE today. Discussed risk of NAZARIO with patient. Will give Mucomyst 1200 mg PO bid x 4 doses and NS @ 125cc/hr (12 hrs pre and 12 hrs post contrast) to minimize risk of contrast induced nephropathy.   Pt has not seen a physician in >3 years. Spoke with PMD's office last SCr 1.08 (eGFR on 55ml/min) on 7/6/2018. UA active likely in the setting of UTI rather than GN.   Spot Upr/Cr 1.98; however inaccurate in the setting of infection. Recc to repeat spot UPr/Cr after infection cleared.   Renal US with no right hydro(did not visualize left kidney). Strict I/Os. Avoid nephrotoxins/ NSAIDs/ RCA. Monitor BMP.  2. Sepsis 2/2 UTI/ Proteus Mirabilis bacteremia-  Pt on Ceftriaxone. UCx NG, however collected after antibiotics intiated.  ID following  3. LE edema- Resolved. No LE DVT seen on dopplers. TTE with normal EF. Avoid diuretics at this time. c/w low salt diet.   4. Hypokalemia- resolved s/p repletion.  Monitor lytes.   5. Small nonobstructive renal calculi.  Encourage good po fluid intake. No further w/u at this time.    [facility] NEPHROLOGY  Deandre Morrow M.D.  Matthew Walls D.O.  Mima Baker M.D.  Nadia Vang, MSN, ANP-C  [phone]    [id number] [id number]

## 2021-04-17 NOTE — PROGRESS NOTE ADULT - SUBJECTIVE AND OBJECTIVE BOX
[facility] NEPHROLOGY- PROGRESS NOTE    Patient is a 66yo Morbidly obese Female with HLD s/p cholecystectomy p/w chills and SOB. Pt a/w Sepsis 2/2 UTI, concerns for PE/ high suspicion for COVID and RODRI. Now with GNR Bacteremia. Nephrology consulted for Elevated serum creatinine.    Hospital Medications: Medications reviewed.  REVIEW OF SYSTEMS:  CONSTITUTIONAL: No fevers or chills  RESPIRATORY: No shortness of breath at rest +SNYDER  CARDIOVASCULAR: No chest pain.  GASTROINTESTINAL: No nausea, vomiting, diarrhea or abdominal pain.   VASCULAR: No bilateral lower extremity edema.     VITALS:  T(F): 97.8 (21 @ 11:13), Max: 98 (21 @ 04:51)  HR: 91 (21 @ 11:13)  BP: 139/69 (21 @ 11:13)  RR: 18 (21 @ 11:13)  SpO2: 95% (21 @ 11:13)  Wt(kg): --     @ 07:01  -   @ 07:00  --------------------------------------------------------  IN: 1360 mL / OUT: 0 mL / NET: 1360 mL        PHYSICAL EXAM:  Gen: NAD, morbidly obese  Cards: RRR, +S1/S2, no M/G/R  Resp: CTA B/L  GI: soft, NT/ND, +abd hernia  : no CVA tenderness  Extremities: No LE edema B/L  Derm: small excoriation scabs    LABS:      138  |  104  |  21<H>  ----------------------------<  114<H>  3.6   |  28  |  1.10    Ca    8.9      2021 06:15  Phos  3.1       Mg     2.0         TPro  7.1  /  Alb  2.2<L>  /  TBili  0.4  /  DBili      /  AST  49<H>  /  ALT  39  /  AlkPhos  207<H>      Creatinine Trend: 1.10 <--, 1.35 <--, 1.71 <--, 2.83 <--, 2.70 <--                        11.1   13.27 )-----------( 238      ( 2021 06:15 )             34.9     Urine Studies:  Urinalysis Basic - ( 2021 03:33 )    Color: Red / Appearance: Turbid / S.015 / pH:   Gluc:  / Ketone: Trace  / Bili: Negative / Urobili: 1   Blood:  / Protein: 500 mg/dL / Nitrite: Positive   Leuk Esterase: Moderate / RBC: >50 /HPF / WBC 26-50 /HPF   Sq Epi:  / Non Sq Epi: Few /HPF / Bacteria: Many /HPF      Sodium, Random Urine: 35 mmol/L ( @ 03:32)  Creatinine, Random Urine: 301 mg/dL ( @ 03:32)  Osmolality, Random Urine: 481 mos/kg ( @ 03:32)      < from: US Renal (04.15.21 @ 10:40) >    EXAM:  US KIDNEY(S)                            PROCEDURE DATE:  04/15/2021          INTERPRETATION:  CLINICAL INFORMATION: Sepsis, bacteremia, urinary tract infection and hematuria    COMPARISON: None available.    TECHNIQUE: Sonography of the kidneys and bladder.    FINDINGS:    Right kidney: 12.5 cm. No renal mass, or hydronephrosis. 2 small echogenic foci measuring up to 7 mm, suggestive of nonobstructive calculi.    Left kidney: Not visualized due to patient's body habitus.    Urinary bladder: Grossly unremarkable.    Hepatic steatosis is incidentally noted.    IMPRESSION:    The left kidney is not visualized due to patient's body habitus.    No right hydronephrosis.    2 small echogenic foci in the right kidney, suggestive of nonobstructive calculi.              NATHALY QUARLES MD; Attending Radiologist  This document has been electronically signed. Apr 15 2021 11:35AM    < end of copied text >

## 2021-04-17 NOTE — PROGRESS NOTE ADULT - PROBLEM SELECTOR PLAN 2
- p/w spO2 91% on RA, tachy 140s, D-dimer 5600  - LE doppler no DVT  - echo no right heart strain  - c/w empiric heparin ggt  - no CTA 2/2 weight limit  - no V/Q 2/2 weight limit  - possible stress test over the weekend - p/w spO2 91% on RA, tachy 140s, D-dimer 5600  - LE doppler no DVT  - echo no right heart strain  - c/w empiric heparin ggt  - no CTA 2/2 weight limit  - no V/Q 2/2 weight limit  - no plan for stress test per cardio

## 2021-04-17 NOTE — PROGRESS NOTE ADULT - ASSESSMENT
UTI - likely source , culture was negative as it was taken after antibiotics started.  Bacteremia - proteus mirabilis  Leukocytosis - mild    Plan: Continue Rocephin 1g iv daily D# 5  will plan to to give IV abxs till Monday's dose then switch to po abxs if being discharged home Monday.

## 2021-04-17 NOTE — PROGRESS NOTE ADULT - PROBLEM SELECTOR PLAN 1
- resolved    - p/w shievering and fever, associated w/ urinary urgency & frequency  - admission: temp of 99.9, , WBC 11K, lactate 2.5,  /73   - Likely 2/2 UTI   - UA pos  - Bcx proteus mirabilis  - Ucx neg  - s/p vanco x1, rocephin x1, 1L NS bolus in ED  - c/w rocephin 1g q24h (4 of 7)    - ID, Dr. Long, umuord - p/w shievering and fever, associated w/ urinary urgency & frequency  - admission: temp of 99.9, , WBC 11K, lactate 2.5,  /73   - Likely 2/2 UTI   - UA pos  - Bcx proteus mirabilis  - Ucx neg  - s/p vanco x1, rocephin x1, 1L NS bolus in ED  - c/w rocephin 1g q24h (4 of 7)    - ID, alta Han

## 2021-04-17 NOTE — PROGRESS NOTE ADULT - PROBLEM SELECTOR PLAN 4
- noted to have abnormal LFTs on admission  - likely liver origin  -   -   - ALT 36  -   - hepatitis panel neg  - monitor LFTs - h/o HTN, on no meds at home  - monitor BP

## 2021-04-17 NOTE — PROGRESS NOTE ADULT - SUBJECTIVE AND OBJECTIVE BOX
PGY-1 Progress Note discussed with attending    PAGER #: [[phone]] TILL 5:00 PM  PLEASE CONTACT ON CALL TEAM:  - On Call Team (Please refer to Ayan) FROM 5:00 PM - 8:30PM  - Nightfloat Team FROM 8:30 -7:30 AM    OVERNIGHT EVENTS:   - no acute events overnight. Patient reports no complaints. She is resting comfortably in bed.    REVIEW OF SYSTEMS:  CONSTITUTIONAL: No fever, weight loss, or fatigue  RESPIRATORY: No cough, wheezing, chills or hemoptysis; No shortness of breath  CARDIOVASCULAR: No chest pain, palpitations, dizziness, or leg swelling  GASTROINTESTINAL: No abdominal pain. No nausea, vomiting, or hematemesis; No diarrhea or constipation. No melena or hematochezia.  GENITOURINARY: No dysuria or hematuria, urinary frequency  NEUROLOGICAL: No headaches, memory loss, loss of strength, numbness, or tremors  SKIN: No itching, burning, rashes, or lesions     MEDICATIONS  (STANDING):  cefTRIAXone   IVPB 1000 milliGRAM(s) IV Intermittent every 24 hours  heparin  Infusion.  Unit(s)/Hr (24 mL/Hr) IV Continuous <Continuous>  lactated ringers. 1000 milliLiter(s) (125 mL/Hr) IV Continuous <Continuous>  sodium chloride 0.9%. 1000 milliLiter(s) (125 mL/Hr) IV Continuous <Continuous>    MEDICATIONS  (PRN):  heparin   Injectable 28639 Unit(s) IV Push every 6 hours PRN For aPTT less than 40  heparin   Injectable 5000 Unit(s) IV Push every 6 hours PRN For aPTT between 40 - 57      Vital Signs Last 24 Hrs  T(C): 36.6 (17 Apr 2021 07:09), Max: 36.7 (16 Apr 2021 11:18)  T(F): 97.8 (17 Apr 2021 07:09), Max: 98 (16 Apr 2021 11:18)  HR: 104 (17 Apr 2021 07:09) (95 - 104)  BP: 160/89 (17 Apr 2021 07:09) (133/64 - 160/89)  BP(mean): --  RR: 18 (17 Apr 2021 07:09) (18 - 18)  SpO2: 93% (17 Apr 2021 07:09) (93% - 97%)    PHYSICAL EXAMINATION:  GENERAL: NAD, AAOx3, morbidly obese  HEAD: AT/NC  EYES: conjunctiva and sclera clear  NECK: supple, No JVD noted, Normal thyroid  CHEST/LUNG: decreased lung sounds noted b/l; no rales, rhonchi, wheezing, or rubs  HEART: regular rate and rhythm; no murmurs, rubs, or gallops  ABDOMEN: soft, nontender, nondistended; Bowel sounds present  EXTREMITIES:  2+ Peripheral Pulses, No clubbing, cyanosis, or edema  SKIN: warm dry                          11.1   13.27 )-----------( 238      ( 17 Apr 2021 06:15 )             34.9     04-17    138  |  104  |  21<H>  ----------------------------<  114<H>  3.6   |  28  |  1.10    Ca    8.9      17 Apr 2021 06:15  Phos  3.1     04-17  Mg     2.0     04-17    TPro  7.1  /  Alb  2.2<L>  /  TBili  0.4  /  DBili  x   /  AST  49<H>  /  ALT  39  /  AlkPhos  207<H>  04-17    LIVER FUNCTIONS - ( 17 Apr 2021 06:15 )  Alb: 2.2 g/dL / Pro: 7.1 g/dL / ALK PHOS: 207 U/L / ALT: 39 U/L DA / AST: 49 U/L / GGT: x               PTT - ( 17 Apr 2021 06:15 )  PTT:92.6 sec  COVID-19 PCR: NotDetec (14 Apr 2021 18:55)  SARS-CoV-2: NotDetec (13 Apr 2021 23:06)      CAPILLARY BLOOD GLUCOSE          RADIOLOGY & ADDITIONAL TESTS:

## 2021-04-17 NOTE — PROGRESS NOTE ADULT - PROBLEM SELECTOR PLAN 5
- resolved    - p/w Cr 2.7; unknown baseline  - likely prerenal 2/2 poor PO intake  - 2.7>>1.7>1.3>1.1  - FeNa 0.2%   - s/p IVF   - monitor BMP - resolved    - p/w shievering and fever, associated w/ urinary urgency & frequency  - admission: temp of 99.9, , WBC 11K, lactate 2.5,  /73   - Likely 2/2 UTI   - UA pos  - Bcx proteus mirabilis  - Ucx neg  - s/p vanco x1, rocephin x1, 1L NS bolus in ED  - c/w rocephin 1g q24h (4 of 7)    - ID, Dr. Long, umuord

## 2021-04-17 NOTE — PROGRESS NOTE ADULT - PROBLEM SELECTOR PLAN 3
- as above - noted to have abnormal LFTs on admission  - likely liver origin  -   -   - ALT 36  -   - hepatitis panel neg  - monitor LFTs

## 2021-04-17 NOTE — PROGRESS NOTE ADULT - SUBJECTIVE AND OBJECTIVE BOX
65y Female    Meds:  cefTRIAXone   IVPB 1000 milliGRAM(s) IV Intermittent every 24 hours    Allergies    No Known Allergies    Intolerances        VITALS:  Vital Signs Last 24 Hrs  T(C): 36.6 (17 Apr 2021 11:13), Max: 36.7 (17 Apr 2021 04:51)  T(F): 97.8 (17 Apr 2021 11:13), Max: 98 (17 Apr 2021 04:51)  HR: 91 (17 Apr 2021 11:13) (91 - 104)  BP: 139/69 (17 Apr 2021 11:13) (135/65 - 160/89)  BP(mean): --  RR: 18 (17 Apr 2021 11:13) (18 - 18)  SpO2: 95% (17 Apr 2021 11:13) (93% - 97%)    LABS/DIAGNOSTIC TESTS:                          11.1   13.27 )-----------( 238      ( 17 Apr 2021 06:15 )             34.9         04-17    138  |  104  |  21<H>  ----------------------------<  114<H>  3.6   |  28  |  1.10    Ca    8.9      17 Apr 2021 06:15  Phos  3.1     04-17  Mg     2.0     04-17    TPro  7.1  /  Alb  2.2<L>  /  TBili  0.4  /  DBili  x   /  AST  49<H>  /  ALT  39  /  AlkPhos  207<H>  04-17      LIVER FUNCTIONS - ( 17 Apr 2021 06:15 )  Alb: 2.2 g/dL / Pro: 7.1 g/dL / ALK PHOS: 207 U/L / ALT: 39 U/L DA / AST: 49 U/L / GGT: x             CULTURES: .Blood Blood-Peripheral  04-16 @ 10:12   No growth to date.  --  --      .Urine Clean Catch (Midstream)  04-14 @ 21:48   No growth  --  --      .Blood Blood-Peripheral  04-14 @ 02:59   Growth in aerobic and anaerobic bottles: Proteus mirabilis  ***Blood Panel PCR results on this specimen are available  approximately 3 hours after the Gram stain result.***  Gram stain, PCR, and/or culture results may not always  correspond due to difference in methodologies.  ************************************************************  This PCR assay was performed by multiplex PCR. This  Assay tests for 66 bacterial and resistance gene targets.  Please refer to the [facility] test directory  at [email or web address] for details.  --  Blood Culture PCR  Proteus mirabilis            RADIOLOGY:      ROS:  [  ] UNABLE TO ELICIT 65y Female who is looking and feeling much better overall , she has no SOB, no chest pain, no fevers or chills , no nausea, vomiting or diarrhea, she still has frequency of urination and incontinence , her repeat blood cultures are negative to date.    Meds:  cefTRIAXone   IVPB 1000 milliGRAM(s) IV Intermittent every 24 hours    Allergies    No Known Allergies    Intolerances        VITALS:  Vital Signs Last 24 Hrs  T(C): 36.6 (17 Apr 2021 11:13), Max: 36.7 (17 Apr 2021 04:51)  T(F): 97.8 (17 Apr 2021 11:13), Max: 98 (17 Apr 2021 04:51)  HR: 91 (17 Apr 2021 11:13) (91 - 104)  BP: 139/69 (17 Apr 2021 11:13) (135/65 - 160/89)  BP(mean): --  RR: 18 (17 Apr 2021 11:13) (18 - 18)  SpO2: 95% (17 Apr 2021 11:13) (93% - 97%)    LABS/DIAGNOSTIC TESTS:                          11.1   13.27 )-----------( 238      ( 17 Apr 2021 06:15 )             34.9         04-17    138  |  104  |  21<H>  ----------------------------<  114<H>  3.6   |  28  |  1.10    Ca    8.9      17 Apr 2021 06:15  Phos  3.1     04-17  Mg     2.0     04-17    TPro  7.1  /  Alb  2.2<L>  /  TBili  0.4  /  DBili  x   /  AST  49<H>  /  ALT  39  /  AlkPhos  207<H>  04-17      LIVER FUNCTIONS - ( 17 Apr 2021 06:15 )  Alb: 2.2 g/dL / Pro: 7.1 g/dL / ALK PHOS: 207 U/L / ALT: 39 U/L DA / AST: 49 U/L / GGT: x             CULTURES: .Blood Blood-Peripheral  04-16 @ 10:12   No growth to date.  --  --      .Urine Clean Catch (Midstream)  04-14 @ 21:48   No growth  --  --      .Blood Blood-Peripheral  04-14 @ 02:59   Growth in aerobic and anaerobic bottles: Proteus mirabilis            RADIOLOGY:      ROS:  [  ] UNABLE TO ELICIT

## 2021-04-17 NOTE — DISCHARGE NOTE PROVIDER - HOSPITAL COURSE
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 Patient admitted for sepsis and suspected pulmonary embolism. 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 Patient admitted for sepsis and suspected pulmonary embolism. Urinalysis was positive, but subsequent urine culture was positive; likely due to urine culture drawn after initiation of antibiotic. Blood culture showed proteus mirabilis. Patient was empirically treated with Vancomycin and ceftriaxone. Ceftriaxone was given for total of 7 days. Patient will be discharged on ceftin for another 7 days.     Patient presented with shortness of breath. LE doppler was negative. Echocardiogram showed no signs of right heart strain. NO CTA chest and V/Q scan was performed due to patient's body habitus. Patient is empirically treated with heparin        Problem: UTI (urinary tract infection). Plan: - p/w shievering and fever, associated w/ urinary urgency & frequency  - admission: temp of 99.9, , WBC 11K, lactate 2.5,  /73   - Likely 2/2 UTI   - UA pos  - Bcx proteus mirabilis  - Ucx neg  - s/p vanco x1, rocephin x1, 1L NS bolus in ED  - c/w rocephin 1g q24h (7 of 7)    - ID, alta Han.       Problem/Plan - 2:  ·  Problem: Suspected pulmonary embolism.  Plan: - p/w spO2 91% on RA, tachy 140s, D-dimer 5600  - LE doppler no DVT  - echo no right heart strain  - c/w empiric heparin ggt  - no CTA 2/2 weight limit  - no V/Q 2/2 weight limit  - no plan for stress test per cardio.       Problem/Plan - 3:  ·  Problem: Abnormal liver enzymes. Plan: - noted to have abnormal LFTs on admission  - likely liver origin  -   -   - ALT 36  -   - hepatitis panel neg  - monitor LFTs.     Problem/Plan - 4:  ·  Problem: HTN (hypertension). Plan: - h/o HTN, on no meds at home  - monitor BP.       Problem/Plan - 5:  ·  Problem: Sepsis. Plan: - resolved    - p/w shievering and fever, associated w/ urinary urgency & frequency  - admission: temp of 99.9, , WBC 11K, lactate 2.5,  /73   - Likely 2/2 UTI   - UA pos  - Bcx proteus mirabilis  - Ucx neg  - s/p vanco x1, rocephin x1, 1L NS bolus in ED  - c/w rocephin 1g q24h (4 of 7)    - ID, Dr. Long, [place].       Problem/Plan - 6:  Problem: RODRI (acute kidney injury).Plan: - resolved    - p/w Cr 2.7; unknown baseline  - likely prerenal 2/2 poor PO intake  - 2.7>>1.7>1.3>1.1  - FeNa 0.2%   - s/p IVF   - monitor BMP.       Problem/Plan - 7:  ·  Problem: Troponin level elevated.  Plan: - trended down    - p/w trop 1.970>4.3>3.1  - likely 2/2 PE  - TTE no right heart strain noted    - Cardio, Dr. Adame, onboard.      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 Patient admitted for sepsis and suspected pulmonary embolism. Urinalysis was positive, but subsequent urine culture was positive; likely due to urine culture drawn after initiation of antibiotic. Blood culture showed proteus mirabilis. Patient was empirically treated with Vancomycin and ceftriaxone. Ceftriaxone was given for total of 7 days. Patient will be discharged on ceftin for another 7 days.     Patient presented with shortness of breath. LE doppler was negative. Echocardiogram showed no signs of right heart strain. NO CTA chest and V/Q scan was performed due to patient's body habitus. Patient is empirically treated with heparin drip. Patient will be discharged on Eliquis.     Patient noted to have abnormal liver enzymes, likely due to bacteremia. Hepatitis panel was negative. LFTs were monitored during the stay.    Patient noted to have elevated creatinine level on admission, likely due to poor oral intake. Patient was treated with IVF.     Patient was noted to have elevated troponin level, which trended down during the stay, likely due to suspected pulmonary embolism. 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 Patient admitted for sepsis and suspected pulmonary embolism. Urinalysis was positive, but subsequent urine culture was positive; likely due to urine culture drawn after initiation of antibiotic. Blood culture showed proteus mirabilis. Patient was empirically treated with Vancomycin and ceftriaxone. Ceftriaxone was given for total of 7 days. Patient will be discharged on ceftin for another 7 days.     Patient presented with shortness of breath. LE doppler was negative. Echocardiogram showed no signs of right heart strain. NO CTA chest and V/Q scan was performed due to patient's body habitus. Patient is empirically treated with heparin drip. Patient will be discharged on Eliquis. O2 saturation on ambulation is 96% on RA    Patient noted to have abnormal liver enzymes, likely due to bacteremia. Hepatitis panel was negative. LFTs were monitored during the stay.    Patient noted to have elevated creatinine level on admission, likely due to poor oral intake. Patient was treated with IVF.     Patient was noted to have elevated troponin level, which trended down during the stay, likely due to suspected pulmonary embolism. 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 Patient admitted for sepsis and suspected pulmonary embolism. Urinalysis was positive, but subsequent urine culture was positive; likely due to urine culture drawn after initiation of antibiotic. Blood culture showed proteus mirabilis. Patient was empirically treated with Vancomycin and ceftriaxone. Ceftriaxone was given for total of 7 days. Patient will be discharged on ceftin for another 7 days.     Patient presented with shortness of breath. LE doppler was negative. Echocardiogram showed no signs of right heart strain. NO CTA chest and V/Q scan was performed due to patient's body habitus. Patient is empirically treated with heparin drip. Patient will be discharged on Eliquis. O2 saturation on ambulation is 96% on RA    Patient noted to have abnormal liver enzymes, likely due to bacteremia. Hepatitis panel was negative. LFTs were monitored during the stay.    Patient noted to have elevated creatinine level on admission, likely due to poor oral intake. Patient was treated with IVF.     Patient was noted to have elevated troponin level, which trended down during the stay, likely due to suspected pulmonary embolism.     Patient is stable for discharge. 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 Patient admitted for sepsis and suspected pulmonary embolism. Urinalysis was positive, but subsequent urine culture was negative; likely due to urine culture drawn after initiation of antibiotic.  Patient was empirically treated with Vancomycin and ceftriaxone. Blood culture showed proteus mirabilis. Ceftriaxone was given for total of 7 days. Patient will be discharged on ceftin for another 7 days.     Patient presented with shortness of breath. LE doppler was negative. Echocardiogram showed no signs of right heart strain. NO CTA chest and V/Q scan was performed due to patient's body habitus. Patient is empirically treated with heparin drip. Patient will be discharged on Eliquis. O2 saturation on ambulation is 96% on RA    Patient noted to have abnormal liver enzymes, likely due to bacteremia. Hepatitis panel was negative. LFTs were monitored during the stay.    Patient noted to have elevated creatinine level on admission, likely due to poor oral intake. Patient was treated with IVF.     Patient was noted to have elevated troponin level, which trended down during the stay, likely due to suspected pulmonary embolism.     Patient is stable for discharge. This just a brief summary, please refer to notes for a detailed course     Med Attend Day of discharge assessment for 4/19/21  Patient seen and evaluated at bedside on the day of discharge. Patient feels well and requesting discharge home. Plan of care including 7 day course of oral antibiotics and empiric regimen of eliquis to complete at home discussed with the patient.  She reports she never had recurrence of SOB, SNYDER. She denies melena, hematochezia. Also discussed finding of mildly elevated LFTs and low albumin with patient, advised follow up with PMD and outpatient abdominal ultrasound. Physical exam reveals morbidly obese female with 1+ bilateral pitting edema, excoriations of bilateral lower extremities  diagnoses include:    1.Sepsis present on admission  2. proteus mirabilis bacteremia s/t presumed UTI  3. suspected pulmonary embolus, acute hypoxia, resolved  4. morbid obesity  5. abnormal LFTs  6. hypoalbuminemia  35 min spent on bedside physical exam, discussion and coordination of care

## 2021-04-18 LAB
ALBUMIN SERPL ELPH-MCNC: 2.3 G/DL — LOW (ref 3.5–5)
ALP SERPL-CCNC: 220 U/L — HIGH (ref 40–120)
ALT FLD-CCNC: 42 U/L DA — SIGNIFICANT CHANGE UP (ref 10–60)
ANION GAP SERPL CALC-SCNC: 8 MMOL/L — SIGNIFICANT CHANGE UP (ref 5–17)
APTT BLD: 102.4 SEC — HIGH (ref 27.5–35.5)
APTT BLD: 93.7 SEC — HIGH (ref 27.5–35.5)
AST SERPL-CCNC: 42 U/L — HIGH (ref 10–40)
BASOPHILS # BLD AUTO: 0.11 K/UL — SIGNIFICANT CHANGE UP (ref 0–0.2)
BASOPHILS NFR BLD AUTO: 0.7 % — SIGNIFICANT CHANGE UP (ref 0–2)
BILIRUB SERPL-MCNC: 0.4 MG/DL — SIGNIFICANT CHANGE UP (ref 0.2–1.2)
BUN SERPL-MCNC: 19 MG/DL — HIGH (ref 7–18)
CALCIUM SERPL-MCNC: 8.6 MG/DL — SIGNIFICANT CHANGE UP (ref 8.4–10.5)
CHLORIDE SERPL-SCNC: 103 MMOL/L — SIGNIFICANT CHANGE UP (ref 96–108)
CO2 SERPL-SCNC: 28 MMOL/L — SIGNIFICANT CHANGE UP (ref 22–31)
CREAT SERPL-MCNC: 1.1 MG/DL — SIGNIFICANT CHANGE UP (ref 0.5–1.3)
EOSINOPHIL # BLD AUTO: 0.43 K/UL — SIGNIFICANT CHANGE UP (ref 0–0.5)
EOSINOPHIL NFR BLD AUTO: 2.9 % — SIGNIFICANT CHANGE UP (ref 0–6)
GLUCOSE SERPL-MCNC: 114 MG/DL — HIGH (ref 70–99)
HCT VFR BLD CALC: 36.2 % — SIGNIFICANT CHANGE UP (ref 34.5–45)
HGB BLD-MCNC: 11.5 G/DL — SIGNIFICANT CHANGE UP (ref 11.5–15.5)
IMM GRANULOCYTES NFR BLD AUTO: 1.7 % — HIGH (ref 0–1.5)
LYMPHOCYTES # BLD AUTO: 25.1 % — SIGNIFICANT CHANGE UP (ref 13–44)
LYMPHOCYTES # BLD AUTO: 3.78 K/UL — HIGH (ref 1–3.3)
MAGNESIUM SERPL-MCNC: 1.9 MG/DL — SIGNIFICANT CHANGE UP (ref 1.6–2.6)
MCHC RBC-ENTMCNC: 29.1 PG — SIGNIFICANT CHANGE UP (ref 27–34)
MCHC RBC-ENTMCNC: 31.8 GM/DL — LOW (ref 32–36)
MCV RBC AUTO: 91.6 FL — SIGNIFICANT CHANGE UP (ref 80–100)
MONOCYTES # BLD AUTO: 1.25 K/UL — HIGH (ref 0–0.9)
MONOCYTES NFR BLD AUTO: 8.3 % — SIGNIFICANT CHANGE UP (ref 2–14)
NEUTROPHILS # BLD AUTO: 9.22 K/UL — HIGH (ref 1.8–7.4)
NEUTROPHILS NFR BLD AUTO: 61.3 % — SIGNIFICANT CHANGE UP (ref 43–77)
NRBC # BLD: 0 /100 WBCS — SIGNIFICANT CHANGE UP (ref 0–0)
PHOSPHATE SERPL-MCNC: 3.2 MG/DL — SIGNIFICANT CHANGE UP (ref 2.5–4.5)
PLATELET # BLD AUTO: 250 K/UL — SIGNIFICANT CHANGE UP (ref 150–400)
POTASSIUM SERPL-MCNC: 3.7 MMOL/L — SIGNIFICANT CHANGE UP (ref 3.5–5.3)
POTASSIUM SERPL-SCNC: 3.7 MMOL/L — SIGNIFICANT CHANGE UP (ref 3.5–5.3)
PROT SERPL-MCNC: 7.9 G/DL — SIGNIFICANT CHANGE UP (ref 6–8.3)
RBC # BLD: 3.95 M/UL — SIGNIFICANT CHANGE UP (ref 3.8–5.2)
RBC # FLD: 14.6 % — HIGH (ref 10.3–14.5)
SODIUM SERPL-SCNC: 139 MMOL/L — SIGNIFICANT CHANGE UP (ref 135–145)
WBC # BLD: 15.04 K/UL — HIGH (ref 3.8–10.5)
WBC # FLD AUTO: 15.04 K/UL — HIGH (ref 3.8–10.5)

## 2021-04-18 PROCEDURE — 99233 SBSQ HOSP IP/OBS HIGH 50: CPT

## 2021-04-18 RX ORDER — APIXABAN 2.5 MG/1
5 TABLET, FILM COATED ORAL
Refills: 0 | Status: DISCONTINUED | OUTPATIENT
Start: 2021-04-18 | End: 2021-04-19

## 2021-04-18 RX ORDER — ACETAMINOPHEN 500 MG
650 TABLET ORAL EVERY 6 HOURS
Refills: 0 | Status: DISCONTINUED | OUTPATIENT
Start: 2021-04-18 | End: 2021-04-19

## 2021-04-18 RX ADMIN — Medication 650 MILLIGRAM(S): at 18:30

## 2021-04-18 RX ADMIN — Medication 650 MILLIGRAM(S): at 05:24

## 2021-04-18 RX ADMIN — Medication 650 MILLIGRAM(S): at 19:26

## 2021-04-18 RX ADMIN — Medication 650 MILLIGRAM(S): at 05:54

## 2021-04-18 RX ADMIN — CEFTRIAXONE 100 MILLIGRAM(S): 500 INJECTION, POWDER, FOR SOLUTION INTRAMUSCULAR; INTRAVENOUS at 23:00

## 2021-04-18 RX ADMIN — APIXABAN 5 MILLIGRAM(S): 2.5 TABLET, FILM COATED ORAL at 17:48

## 2021-04-18 RX ADMIN — HEPARIN SODIUM 2100 UNIT(S)/HR: 5000 INJECTION INTRAVENOUS; SUBCUTANEOUS at 05:46

## 2021-04-18 NOTE — PROGRESS NOTE ADULT - SUBJECTIVE AND OBJECTIVE BOX
65y Female    Meds:  cefTRIAXone   IVPB 1000 milliGRAM(s) IV Intermittent every 24 hours    Allergies    No Known Allergies    Intolerances        VITALS:  Vital Signs Last 24 Hrs  T(C): 36.4 (18 Apr 2021 15:30), Max: 36.8 (18 Apr 2021 05:10)  T(F): 97.5 (18 Apr 2021 15:30), Max: 98.3 (18 Apr 2021 11:15)  HR: 94 (18 Apr 2021 15:30) (94 - 99)  BP: 150/64 (18 Apr 2021 15:30) (139/76 - 161/78)  BP(mean): --  RR: 18 (18 Apr 2021 15:30) (16 - 18)  SpO2: 96% (18 Apr 2021 15:30) (94% - 100%)    LABS/DIAGNOSTIC TESTS:                          11.5   15.04 )-----------( 250      ( 18 Apr 2021 05:32 )             36.2         04-18    139  |  103  |  19<H>  ----------------------------<  114<H>  3.7   |  28  |  1.10    Ca    8.6      18 Apr 2021 05:32  Phos  3.2     04-18  Mg     1.9     04-18    TPro  7.9  /  Alb  2.3<L>  /  TBili  0.4  /  DBili  x   /  AST  42<H>  /  ALT  42  /  AlkPhos  220<H>  04-18      LIVER FUNCTIONS - ( 18 Apr 2021 05:32 )  Alb: 2.3 g/dL / Pro: 7.9 g/dL / ALK PHOS: 220 U/L / ALT: 42 U/L DA / AST: 42 U/L / GGT: x             CULTURES: .Blood Blood  04-16 @ 14:22   No growth to date.  --  --      .Blood Blood-Peripheral  04-16 @ 10:12   No growth to date.  --  --      .Urine Clean Catch (Midstream)  04-14 @ 21:48   No growth  --  --      .Blood Blood-Peripheral  04-14 @ 02:59   Growth in aerobic and anaerobic bottles: Proteus mirabilis            RADIOLOGY:      ROS:  [  ] UNABLE TO ELICIT 65y Female who is doing well, she has no fevers , chills, sob, chest pain, no diarrhea or other complaints. Her repeat blood cultures are negative to date. Her WBC is elevated though.    Meds:  cefTRIAXone   IVPB 1000 milliGRAM(s) IV Intermittent every 24 hours    Allergies    No Known Allergies    Intolerances        VITALS:  Vital Signs Last 24 Hrs  T(C): 36.4 (18 Apr 2021 15:30), Max: 36.8 (18 Apr 2021 05:10)  T(F): 97.5 (18 Apr 2021 15:30), Max: 98.3 (18 Apr 2021 11:15)  HR: 94 (18 Apr 2021 15:30) (94 - 99)  BP: 150/64 (18 Apr 2021 15:30) (139/76 - 161/78)  BP(mean): --  RR: 18 (18 Apr 2021 15:30) (16 - 18)  SpO2: 96% (18 Apr 2021 15:30) (94% - 100%)    LABS/DIAGNOSTIC TESTS:                          11.5   15.04 )-----------( 250      ( 18 Apr 2021 05:32 )             36.2         04-18    139  |  103  |  19<H>  ----------------------------<  114<H>  3.7   |  28  |  1.10    Ca    8.6      18 Apr 2021 05:32  Phos  3.2     04-18  Mg     1.9     04-18    TPro  7.9  /  Alb  2.3<L>  /  TBili  0.4  /  DBili  x   /  AST  42<H>  /  ALT  42  /  AlkPhos  220<H>  04-18      LIVER FUNCTIONS - ( 18 Apr 2021 05:32 )  Alb: 2.3 g/dL / Pro: 7.9 g/dL / ALK PHOS: 220 U/L / ALT: 42 U/L DA / AST: 42 U/L / GGT: x             CULTURES: .Blood Blood  04-16 @ 14:22   No growth to date.  --  --      .Blood Blood-Peripheral  04-16 @ 10:12   No growth to date.  --  --      .Urine Clean Catch (Midstream)  04-14 @ 21:48   No growth  --  --      .Blood Blood-Peripheral  04-14 @ 02:59   Growth in aerobic and anaerobic bottles: Proteus mirabilis            RADIOLOGY:      ROS:  [  ] UNABLE TO ELICIT

## 2021-04-18 NOTE — PROGRESS NOTE ADULT - GASTROINTESTINAL DETAILS
soft/nontender/no distention/bowel sounds normal/no guarding/no rigidity
soft/nontender/no distention/bowel sounds normal/no guarding/no rigidity

## 2021-04-18 NOTE — PROGRESS NOTE ADULT - ASSESSMENT
Patient is a 65 year old Female, from home, morbid obese, with PMH of HTN (not on any meds) and PSH of cholecystectomy, presented with shivering and chills for 4 day, associated with urinary frequency. She was admitted to [facility] for sepsis likely 2/2/ UTI and suspected PE.    ·  Problem: Proteus bacteremia secondary to a UTI.   Pt to Cw Iv Rocephin till Monday. Pt to be discharged on po antibiotics on discharge. ID, Dr. Long.    ·  Problem: Suspected pulmonary embolism.    Heparin drip stopped, pt to start Eliquis 5mg bid today as per cardio.   Unable to do further testing due to patient's weight.     ·  Problem: Abnormal liver enzymes.  Plan: - noted to have abnormal LFTs on admission  - hepatitis panel neg. monitor LFTs.     ·  Problem: HTN (hypertension).    Plan: - h/o HTN, on no meds at home. Now on low dose Zestril.     ·  Problem: Prophylactic measure.  Plan: IMPROVE VTE Individual Risk Assessment  RISK                                                                Points  [  ] Previous VTE                                                  3  [  ] Thrombophilia                                               2  [  ] Lower limb paralysis                                      2        (unable to hold up >15 seconds)    [  ] Current Cancer                                              2         (within 6 months)  [x  ] Immobilization > 24 hrs                                1  [  ] ICU/CCU stay > 24 hours                              1  [ x ] Age > 60                                                      1  IMPROVE VTE Score ___2______  Pt is on Eliquis.     Dispo: plan for dc home tomorrow on po antibiotics and Eliquis if medically stable.

## 2021-04-18 NOTE — PROGRESS NOTE ADULT - SUBJECTIVE AND OBJECTIVE BOX
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 Pt states she has intermittent chills, urinary urgency and incontinent for 4 days. Pt denied chest pain, palpitation, orthopnea, fever, vomiting, abdominal pain, diarrhea, constipation. In the ED, pulse ox of 91% on RA, now Ox 98% on 3L NC.  temp of 99.9, , code sepsis called. D-dimer 5600, heparin drip was started for suspected PE.   (14 Apr 2021 02:03)    Today: Pt states she has pain and swelling to her left foot. Pt notes that she had slept in an uncomfortable position which may have caused this. No sob, chest pain, fevers.     PAST MEDICAL & SURGICAL HISTORY:  HTN (hypertension)    S/P cholecystectomy    MEDICATIONS  (STANDING):  apixaban 5 milliGRAM(s) Oral two times a day  cefTRIAXone   IVPB 1000 milliGRAM(s) IV Intermittent every 24 hours  lactated ringers. 1000 milliLiter(s) (125 mL/Hr) IV Continuous <Continuous>  lisinopril 2.5 milliGRAM(s) Oral daily  sodium chloride 0.9%. 1000 milliLiter(s) (125 mL/Hr) IV Continuous <Continuous>    MEDICATIONS  (PRN):  acetaminophen   Tablet .. 650 milliGRAM(s) Oral every 6 hours PRN Mild Pain (1 - 3)    EXAM:  Vital Signs Last 24 Hrs  T(C): 36.8 (18 Apr 2021 11:15), Max: 36.8 (18 Apr 2021 05:10)  T(F): 98.3 (18 Apr 2021 11:15), Max: 98.3 (18 Apr 2021 11:15)  HR: 96 (18 Apr 2021 11:15) (96 - 99)  BP: 139/76 (18 Apr 2021 11:15) (139/76 - 161/78)  BP(mean): --  RR: 18 (18 Apr 2021 11:15) (16 - 18)  SpO2: 98% (18 Apr 2021 11:15) (94% - 100%)    04-17 @ 07:01  -  04-18 @ 07:00  --------------------------------------------------------  IN: 288 mL / OUT: 0 mL / NET: 288 mL    PHYSICAL EXAM:  Constitutional: awake, nad sitting in her chair. On room air.   Neck: supple  Respiratory: bilaterally clear to auscultation.  Cardiovascular: s1s2, rrr, no murmurs.   Gastrointestinal: soft, non tender, +bowel sounds, no rebound, no guarding.   Extremities: left foot dorsum  and lateral region with edema.   Neurological: alert and oriented to person, date and place.    Psychiatric: appropriate affect.     LABS:    PTT - ( 18 Apr 2021 11:37 )  PTT:93.7 sec  LIVER FUNCTIONS - ( 18 Apr 2021 05:32 )  Alb: 2.3 g/dL / Pro: 7.9 g/dL / ALK PHOS: 220 U/L / ALT: 42 U/L DA / AST: 42 U/L / GGT: x                                 11.5   15.04 )-----------( 250      ( 18 Apr 2021 05:32 )             36.2     04-18    139  |  103  |  19<H>  ----------------------------<  114<H>  3.7   |  28  |  1.10    Ca    8.6      18 Apr 2021 05:32  Phos  3.2     04-18  Mg     1.9     04-18    TPro  7.9  /  Alb  2.3<L>  /  TBili  0.4  /  DBili  x   /  AST  42<H>  /  ALT  42  /  AlkPhos  220<H>  04-18

## 2021-04-18 NOTE — PROGRESS NOTE ADULT - ASSESSMENT
UTI - likely source , culture was negative as it was taken after antibiotics started.  Bacteremia - proteus mirabilis  Leukocytosis - mild    Plan: Continue Rocephin 1g iv daily D# 6  will plan to to give IV abxs till Monday's dose then switch to po abxs if being discharged home Monday.  please give Rocephin dose earlier on Monday if being discharged.  She will need to go on Ceftin 500mgs po bid x 7 days.

## 2021-04-18 NOTE — PROGRESS NOTE ADULT - NEGATIVE GASTROINTESTINAL SYMPTOMS
no nausea/no vomiting/no diarrhea/no abdominal pain
no nausea/no vomiting/no diarrhea/no abdominal pain

## 2021-04-19 VITALS
SYSTOLIC BLOOD PRESSURE: 131 MMHG | HEART RATE: 94 BPM | OXYGEN SATURATION: 97 % | TEMPERATURE: 97 F | DIASTOLIC BLOOD PRESSURE: 63 MMHG | RESPIRATION RATE: 18 BRPM

## 2021-04-19 LAB
ALBUMIN SERPL ELPH-MCNC: 2.3 G/DL — LOW (ref 3.5–5)
ALP SERPL-CCNC: 212 U/L — HIGH (ref 40–120)
ALT FLD-CCNC: 44 U/L DA — SIGNIFICANT CHANGE UP (ref 10–60)
ANION GAP SERPL CALC-SCNC: 7 MMOL/L — SIGNIFICANT CHANGE UP (ref 5–17)
ANISOCYTOSIS BLD QL: SLIGHT — SIGNIFICANT CHANGE UP
AST SERPL-CCNC: 36 U/L — SIGNIFICANT CHANGE UP (ref 10–40)
BASOPHILS # BLD AUTO: 0 K/UL — SIGNIFICANT CHANGE UP (ref 0–0.2)
BASOPHILS NFR BLD AUTO: 0 % — SIGNIFICANT CHANGE UP (ref 0–2)
BILIRUB SERPL-MCNC: 0.5 MG/DL — SIGNIFICANT CHANGE UP (ref 0.2–1.2)
BUN SERPL-MCNC: 20 MG/DL — HIGH (ref 7–18)
CALCIUM SERPL-MCNC: 8.9 MG/DL — SIGNIFICANT CHANGE UP (ref 8.4–10.5)
CHLORIDE SERPL-SCNC: 103 MMOL/L — SIGNIFICANT CHANGE UP (ref 96–108)
CO2 SERPL-SCNC: 30 MMOL/L — SIGNIFICANT CHANGE UP (ref 22–31)
CREAT SERPL-MCNC: 1.17 MG/DL — SIGNIFICANT CHANGE UP (ref 0.5–1.3)
EOSINOPHIL # BLD AUTO: 0.29 K/UL — SIGNIFICANT CHANGE UP (ref 0–0.5)
EOSINOPHIL NFR BLD AUTO: 2 % — SIGNIFICANT CHANGE UP (ref 0–6)
GLUCOSE SERPL-MCNC: 108 MG/DL — HIGH (ref 70–99)
HCT VFR BLD CALC: 35.5 % — SIGNIFICANT CHANGE UP (ref 34.5–45)
HGB BLD-MCNC: 11.3 G/DL — LOW (ref 11.5–15.5)
LYMPHOCYTES # BLD AUTO: 27 % — SIGNIFICANT CHANGE UP (ref 13–44)
LYMPHOCYTES # BLD AUTO: 3.86 K/UL — HIGH (ref 1–3.3)
MAGNESIUM SERPL-MCNC: 2.1 MG/DL — SIGNIFICANT CHANGE UP (ref 1.6–2.6)
MANUAL SMEAR VERIFICATION: SIGNIFICANT CHANGE UP
MCHC RBC-ENTMCNC: 29.4 PG — SIGNIFICANT CHANGE UP (ref 27–34)
MCHC RBC-ENTMCNC: 31.8 GM/DL — LOW (ref 32–36)
MCV RBC AUTO: 92.2 FL — SIGNIFICANT CHANGE UP (ref 80–100)
MONOCYTES # BLD AUTO: 1.29 K/UL — HIGH (ref 0–0.9)
MONOCYTES NFR BLD AUTO: 9 % — SIGNIFICANT CHANGE UP (ref 2–14)
NEUTROPHILS # BLD AUTO: 8.86 K/UL — HIGH (ref 1.8–7.4)
NEUTROPHILS NFR BLD AUTO: 62 % — SIGNIFICANT CHANGE UP (ref 43–77)
NRBC # BLD: 0 /100 — SIGNIFICANT CHANGE UP (ref 0–0)
PHOSPHATE SERPL-MCNC: 3.4 MG/DL — SIGNIFICANT CHANGE UP (ref 2.5–4.5)
PLAT MORPH BLD: NORMAL — SIGNIFICANT CHANGE UP
PLATELET # BLD AUTO: 294 K/UL — SIGNIFICANT CHANGE UP (ref 150–400)
PLATELET COUNT - ESTIMATE: NORMAL — SIGNIFICANT CHANGE UP
POIKILOCYTOSIS BLD QL AUTO: SLIGHT — SIGNIFICANT CHANGE UP
POLYCHROMASIA BLD QL SMEAR: SLIGHT — SIGNIFICANT CHANGE UP
POTASSIUM SERPL-MCNC: 3.6 MMOL/L — SIGNIFICANT CHANGE UP (ref 3.5–5.3)
POTASSIUM SERPL-SCNC: 3.6 MMOL/L — SIGNIFICANT CHANGE UP (ref 3.5–5.3)
PROT SERPL-MCNC: 7.3 G/DL — SIGNIFICANT CHANGE UP (ref 6–8.3)
RBC # BLD: 3.85 M/UL — SIGNIFICANT CHANGE UP (ref 3.8–5.2)
RBC # FLD: 15 % — HIGH (ref 10.3–14.5)
RBC BLD AUTO: ABNORMAL
SMUDGE CELLS # BLD: PRESENT — SIGNIFICANT CHANGE UP
SODIUM SERPL-SCNC: 140 MMOL/L — SIGNIFICANT CHANGE UP (ref 135–145)
WBC # BLD: 14.29 K/UL — HIGH (ref 3.8–10.5)
WBC # FLD AUTO: 14.29 K/UL — HIGH (ref 3.8–10.5)

## 2021-04-19 PROCEDURE — 83605 ASSAY OF LACTIC ACID: CPT

## 2021-04-19 PROCEDURE — 85025 COMPLETE CBC W/AUTO DIFF WBC: CPT

## 2021-04-19 PROCEDURE — 99239 HOSP IP/OBS DSCHRG MGMT >30: CPT | Mod: GC

## 2021-04-19 PROCEDURE — 86703 HIV-1/HIV-2 1 RESULT ANTBDY: CPT

## 2021-04-19 PROCEDURE — 93005 ELECTROCARDIOGRAM TRACING: CPT

## 2021-04-19 PROCEDURE — 80053 COMPREHEN METABOLIC PANEL: CPT

## 2021-04-19 PROCEDURE — 87150 DNA/RNA AMPLIFIED PROBE: CPT

## 2021-04-19 PROCEDURE — 84156 ASSAY OF PROTEIN URINE: CPT

## 2021-04-19 PROCEDURE — 87040 BLOOD CULTURE FOR BACTERIA: CPT

## 2021-04-19 PROCEDURE — 86803 HEPATITIS C AB TEST: CPT

## 2021-04-19 PROCEDURE — 96374 THER/PROPH/DIAG INJ IV PUSH: CPT

## 2021-04-19 PROCEDURE — 82570 ASSAY OF URINE CREATININE: CPT

## 2021-04-19 PROCEDURE — 84100 ASSAY OF PHOSPHORUS: CPT

## 2021-04-19 PROCEDURE — 71045 X-RAY EXAM CHEST 1 VIEW: CPT

## 2021-04-19 PROCEDURE — 76775 US EXAM ABDO BACK WALL LIM: CPT

## 2021-04-19 PROCEDURE — 85730 THROMBOPLASTIN TIME PARTIAL: CPT

## 2021-04-19 PROCEDURE — 80061 LIPID PANEL: CPT

## 2021-04-19 PROCEDURE — 85610 PROTHROMBIN TIME: CPT

## 2021-04-19 PROCEDURE — 82306 VITAMIN D 25 HYDROXY: CPT

## 2021-04-19 PROCEDURE — 93970 EXTREMITY STUDY: CPT

## 2021-04-19 PROCEDURE — 83880 ASSAY OF NATRIURETIC PEPTIDE: CPT

## 2021-04-19 PROCEDURE — 83036 HEMOGLOBIN GLYCOSYLATED A1C: CPT

## 2021-04-19 PROCEDURE — 82607 VITAMIN B-12: CPT

## 2021-04-19 PROCEDURE — 83935 ASSAY OF URINE OSMOLALITY: CPT

## 2021-04-19 PROCEDURE — 82728 ASSAY OF FERRITIN: CPT

## 2021-04-19 PROCEDURE — 84484 ASSAY OF TROPONIN QUANT: CPT

## 2021-04-19 PROCEDURE — 84300 ASSAY OF URINE SODIUM: CPT

## 2021-04-19 PROCEDURE — 87077 CULTURE AEROBIC IDENTIFY: CPT

## 2021-04-19 PROCEDURE — 85379 FIBRIN DEGRADATION QUANT: CPT

## 2021-04-19 PROCEDURE — 85652 RBC SED RATE AUTOMATED: CPT

## 2021-04-19 PROCEDURE — 84443 ASSAY THYROID STIM HORMONE: CPT

## 2021-04-19 PROCEDURE — 83735 ASSAY OF MAGNESIUM: CPT

## 2021-04-19 PROCEDURE — 81001 URINALYSIS AUTO W/SCOPE: CPT

## 2021-04-19 PROCEDURE — 99285 EMERGENCY DEPT VISIT HI MDM: CPT

## 2021-04-19 PROCEDURE — 93306 TTE W/DOPPLER COMPLETE: CPT

## 2021-04-19 PROCEDURE — 0225U NFCT DS DNA&RNA 21 SARSCOV2: CPT

## 2021-04-19 PROCEDURE — 84145 PROCALCITONIN (PCT): CPT

## 2021-04-19 PROCEDURE — 36415 COLL VENOUS BLD VENIPUNCTURE: CPT

## 2021-04-19 PROCEDURE — 83615 LACTATE (LD) (LDH) ENZYME: CPT

## 2021-04-19 PROCEDURE — 87086 URINE CULTURE/COLONY COUNT: CPT

## 2021-04-19 PROCEDURE — 86140 C-REACTIVE PROTEIN: CPT

## 2021-04-19 PROCEDURE — 87186 SC STD MICRODIL/AGAR DIL: CPT

## 2021-04-19 PROCEDURE — 85027 COMPLETE CBC AUTOMATED: CPT

## 2021-04-19 PROCEDURE — 86769 SARS-COV-2 COVID-19 ANTIBODY: CPT

## 2021-04-19 PROCEDURE — 87635 SARS-COV-2 COVID-19 AMP PRB: CPT

## 2021-04-19 PROCEDURE — 82977 ASSAY OF GGT: CPT

## 2021-04-19 PROCEDURE — 80074 ACUTE HEPATITIS PANEL: CPT

## 2021-04-19 RX ORDER — LISINOPRIL 2.5 MG/1
1 TABLET ORAL
Qty: 30 | Refills: 0
Start: 2021-04-19 | End: 2021-05-18

## 2021-04-19 RX ORDER — CEFUROXIME AXETIL 250 MG
1 TABLET ORAL
Qty: 14 | Refills: 0
Start: 2021-04-19 | End: 2021-04-25

## 2021-04-19 RX ORDER — CEFTRIAXONE 500 MG/1
1000 INJECTION, POWDER, FOR SOLUTION INTRAMUSCULAR; INTRAVENOUS EVERY 24 HOURS
Refills: 0 | Status: DISCONTINUED | OUTPATIENT
Start: 2021-04-19 | End: 2021-04-19

## 2021-04-19 RX ORDER — APIXABAN 2.5 MG/1
1 TABLET, FILM COATED ORAL
Qty: 60 | Refills: 0
Start: 2021-04-19 | End: 2021-05-18

## 2021-04-19 RX ADMIN — APIXABAN 5 MILLIGRAM(S): 2.5 TABLET, FILM COATED ORAL at 05:49

## 2021-04-19 RX ADMIN — Medication 650 MILLIGRAM(S): at 12:10

## 2021-04-19 RX ADMIN — APIXABAN 5 MILLIGRAM(S): 2.5 TABLET, FILM COATED ORAL at 17:13

## 2021-04-19 RX ADMIN — CEFTRIAXONE 100 MILLIGRAM(S): 500 INJECTION, POWDER, FOR SOLUTION INTRAMUSCULAR; INTRAVENOUS at 15:43

## 2021-04-19 RX ADMIN — Medication 650 MILLIGRAM(S): at 11:16

## 2021-04-19 NOTE — DISCHARGE NOTE NURSING/CASE MANAGEMENT/SOCIAL WORK - PATIENT PORTAL LINK FT
You can access the FollowMyHealth Patient Portal offered by [facility] by registering at the following website: [email or web address]. By joining [employer]’s FollowMyHealth portal, you will also be able to view your health information using other applications (apps) compatible with our system.

## 2021-04-19 NOTE — PROGRESS NOTE ADULT - ASSESSMENT
UTI - likely source , culture was negative as it was taken after antibiotics started.  Bacteremia - proteus mirabilis  Leukocytosis - mild    Plan: Continue Rocephin 1g iv daily D# 6  She will need to go on Ceftin 500mgs po bid x 7 days.           UTI - likely source , culture was negative as it was taken after antibiotics started.  Bacteremia - proteus mirabilis  Leukocytosis - mild    Plan: Continue Rocephin 1g iv daily   If being discharged today, can give Rocephin early at 3PM and discharged on Ceftin 500mgs po bid x 7 days.           UTI - likely source , culture was negative as it was taken after antibiotics started.  Bacteremia - proteus mirabilis  Leukocytosis - mild    Plan: Continue Rocephin 1g iv daily   If being discharged today, can give Rocephin early at 3PM and discharged on Ceftin 500mgs po bid x 7 days.    I agree with above

## 2021-04-19 NOTE — PROGRESS NOTE ADULT - SUBJECTIVE AND OBJECTIVE BOX
65y Female is under our care for     REVIEW OF SYSTEMS:  [  ] Not able to elicit  General:	  Chest:	  GI:	  :  Skin:	  Musculoskeletal:	  Neuro:	    MEDS:  cefTRIAXone   IVPB 1000 milliGRAM(s) IV Intermittent every 24 hours    ALLERGIES: Allergies    No Known Allergies    Intolerances        VITALS:  Vital Signs Last 24 Hrs  T(C): 36.7 (19 Apr 2021 07:46), Max: 37.1 (18 Apr 2021 19:33)  T(F): 98.1 (19 Apr 2021 07:46), Max: 98.8 (18 Apr 2021 19:33)  HR: 99 (19 Apr 2021 07:46) (88 - 100)  BP: 140/69 (19 Apr 2021 07:46) (139/76 - 155/71)  BP(mean): --  RR: 18 (19 Apr 2021 07:46) (18 - 19)  SpO2: 97% (19 Apr 2021 07:46) (95% - 98%)      PHYSICAL EXAM:  HEENT:  Neck:  Respiratory:  Cardiovascular:  Gastrointestinal:  Extremities:  Skin:  Ortho:  Neuro:    LABS/DIAGNOSTIC TESTS:                        11.3   14.29 )-----------( 294      ( 19 Apr 2021 06:49 )             35.5     WBC Count: 14.29 K/uL (04-19 @ 06:49)  WBC Count: 15.04 K/uL (04-18 @ 05:32)  WBC Count: 13.27 K/uL (04-17 @ 06:15)  WBC Count: 11.67 K/uL (04-16 @ 07:15)  WBC Count: 12.55 K/uL (04-15 @ 06:26)    04-19    140  |  103  |  20<H>  ----------------------------<  108<H>  3.6   |  30  |  1.17    Ca    8.9      19 Apr 2021 06:49  Phos  3.4     04-19  Mg     2.1     04-19    TPro  7.3  /  Alb  2.3<L>  /  TBili  0.5  /  DBili  x   /  AST  36  /  ALT  44  /  AlkPhos  212<H>  04-19      CULTURES:   .Blood Blood  04-16 @ 14:22   No growth to date.  --  --      .Blood Blood-Peripheral  04-16 @ 10:12   No growth to date.  --  --      .Urine Clean Catch (Midstream)  04-14 @ 21:48   No growth  --  --      .Blood Blood-Peripheral  04-14 @ 02:59   Growth in aerobic and anaerobic bottles: Proteus mirabilis  ***Blood Panel PCR results on this specimen are available  approximately 3 hours after the Gram stain result.***  Gram stain, PCR, and/or culture results may not always  correspond due to difference in methodologies.  ************************************************************  This PCR assay was performed by multiplex PCR. This  Assay tests for 66 bacterial and resistance gene targets.  Please refer to the [facility] test directory  at [email or web address] for details.  --  Blood Culture PCR  Proteus mirabilis        RADIOLOGY:  no new studies 65y Female is under our care for UTI and bacteremia.  Patient was seen sitting comfortably in bed with no acute distress.  Patients repeat blood cultures were negative and she denies any shortness of breath or chest pain.  She remains afebrile with slightly elevated WBC count.     REVIEW OF SYSTEMS:  [  ] Not able to elicit  General: no fevers no malaise,   Chest: no cough, sob + upon exertion, no CP  GI: no nvd no abdominal pain  : urinary incontinence and urinary frequency.  Skin: bilateral leg scabs  Musculoskeletal: no trauma no LBP  Neuro: no ha's no dizziness     MEDS:  cefTRIAXone   IVPB 1000 milliGRAM(s) IV Intermittent every 24 hours    ALLERGIES: Allergies    No Known Allergies    Intolerances        VITALS:  Vital Signs Last 24 Hrs  T(C): 36.7 (19 Apr 2021 07:46), Max: 37.1 (18 Apr 2021 19:33)  T(F): 98.1 (19 Apr 2021 07:46), Max: 98.8 (18 Apr 2021 19:33)  HR: 99 (19 Apr 2021 07:46) (88 - 100)  BP: 140/69 (19 Apr 2021 07:46) (139/76 - 155/71)  BP(mean): --  RR: 18 (19 Apr 2021 07:46) (18 - 19)  SpO2: 97% (19 Apr 2021 07:46) (95% - 98%)      PHYSICAL EXAM:  HEENT: normocephalic with moist oral mucosa  Neck: supple no LN's no JVD  Respiratory: lungs clear no rales no rhonchi  Cardiovascular: S1 S2 reg no murmurs  Gastrointestinal: +BS with soft, nondistended abdomen; nontender, obese  : Currently on primafit external urinary catheter  Extremities: no edema no cyanosis  Skin: Bilateral leg scabs from scratching  Ortho: no jt swelling  Neuro: AAO x 4    LABS/DIAGNOSTIC TESTS:                        11.3   14.29 )-----------( 294      ( 19 Apr 2021 06:49 )             35.5     WBC Count: 14.29 K/uL (04-19 @ 06:49)  WBC Count: 15.04 K/uL (04-18 @ 05:32)  WBC Count: 13.27 K/uL (04-17 @ 06:15)  WBC Count: 11.67 K/uL (04-16 @ 07:15)  WBC Count: 12.55 K/uL (04-15 @ 06:26)    04-19    140  |  103  |  20<H>  ----------------------------<  108<H>  3.6   |  30  |  1.17    Ca    8.9      19 Apr 2021 06:49  Phos  3.4     04-19  Mg     2.1     04-19    TPro  7.3  /  Alb  2.3<L>  /  TBili  0.5  /  DBili  x   /  AST  36  /  ALT  44  /  AlkPhos  212<H>  04-19      CULTURES:   .Blood Blood  04-16 @ 14:22   No growth to date.  --  --      .Blood Blood-Peripheral  04-16 @ 10:12   No growth to date.  --  --      .Urine Clean Catch (Midstream)  04-14 @ 21:48   No growth  --  --      .Blood Blood-Peripheral  04-14 @ 02:59   Growth in aerobic and anaerobic bottles: Proteus mirabilis  ***Blood Panel PCR results on this specimen are available  approximately 3 hours after the Gram stain result.***  Gram stain, PCR, and/or culture results may not always  correspond due to difference in methodologies.  ************************************************************  This PCR assay was performed by multiplex PCR. This  Assay tests for 66 bacterial and resistance gene targets.  Please refer to the [facility] test directory  at [email or web address] for details.  --  Blood Culture PCR  Proteus mirabilis        RADIOLOGY:  no new studies

## 2021-04-19 NOTE — PROGRESS NOTE ADULT - PROVIDER SPECIALTY LIST ADULT
Encounter Date: 2/24/2018    SCRIBE #1 NOTE: I, Odalys Carl, lissy scribing for, and in the presence of, Dr. Gordon . Other sections scribed: HPI, ROS, PE.       History     Chief Complaint   Patient presents with    Back Pain     reports lower back pain after falling down 4 steps onto concrete   2 days ago - was seen at  ed and urgent care and received rx meds with no relief - pt reports no imagin performed.     Time patient was seen by the provider: 6:22 AM      The patient is a 80 y.o. female with co-morbidities including: arthritis, HTN, HLD, who presents to the ED with a complaint of lower back pain s/p mechanical fall from five days ago. The patient reports she fell backwards down four steps falling onto the concrete on her back. Patient states pain radiates from lower back down to her legs, and rates pain 8/10. She has gone to the ER and urgent care two days ago and received Narco 7.5 mg for the pain however presents today because medicine is giving no relief. Patient also states no imaging was performed at the ER or urgent care. She denies any difficulty urinating, numbness/tingling, and diarrhea.          The history is provided by the patient and medical records.     Review of patient's allergies indicates:  No Known Allergies  Past Medical History:   Diagnosis Date    Arthritis     Hyperlipidemia     Hypertension      Past Surgical History:   Procedure Laterality Date    HYSTERECTOMY N/A      History reviewed. No pertinent family history.  Social History   Substance Use Topics    Smoking status: Former Smoker    Smokeless tobacco: Never Used    Alcohol use No     Review of Systems   Constitutional: Negative for fever.   HENT: Negative for sore throat.    Respiratory: Negative for shortness of breath.    Cardiovascular: Negative for chest pain.   Gastrointestinal: Negative for nausea.   Genitourinary: Negative for dysuria.   Musculoskeletal: Positive for back pain (Lower back pain).   Skin: 
Infectious Disease
Internal Medicine
Nephrology
Infectious Disease
Infectious Disease
Internal Medicine
Negative for rash.   Neurological: Negative for weakness and numbness.   Hematological: Does not bruise/bleed easily.       Physical Exam     Initial Vitals [02/24/18 0522]   BP Pulse Resp Temp SpO2   (!) 210/81 80 16 98.5 °F (36.9 °C) 99 %      MAP       124         Physical Exam    Nursing note and vitals reviewed.  Constitutional: She appears well-developed and well-nourished. No distress.   HENT:   Head: Normocephalic and atraumatic.   Mouth/Throat: Oropharynx is clear and moist.   Eyes: Conjunctivae and EOM are normal. Pupils are equal, round, and reactive to light.   Neck: Normal range of motion. Neck supple.   Cardiovascular: Normal rate, regular rhythm and normal heart sounds. Exam reveals no gallop and no friction rub.    No murmur heard.  Pulmonary/Chest: Breath sounds normal. No respiratory distress. She has no rhonchi. She has no rales.   Abdominal: Soft. She exhibits no distension. There is no tenderness. There is no rebound and no guarding.   Musculoskeletal: Normal range of motion. She exhibits no edema or tenderness.   Neurological: She is alert and oriented to person, place, and time. No sensory deficit.   Awake. Appropriate. Moves all extremities symmetrically. No motor deficits.     Skin: Skin is warm and dry. No rash noted.   Psychiatric:   Good eye contact. Normal interaction.          ED Course   Procedures  Labs Reviewed - No data to display     Imaging Results          CT Lumbar Spine Without Contrast (Final result)  Result time 02/24/18 08:53:50    Final result by Gretchen Dia MD (02/24/18 08:53:50)                 Impression:      Age-indeterminate compression fracture of the L4 vertebral body.  Correlation with point tenderness recommended.  Additionally, further evaluation with MRI can be performed for further characterization.    Multilevel degenerative change of the thoracolumbar spine.    ______________________________________     Electronically signed by resident: KAREN WHATLEY 
Cardiology
Hospitalist
MD  Date:     02/24/18  Time:    08:08            As the supervising and teaching physician, I personally reviewed the images and resident's interpretation and I agree with the findings.            Electronically signed by: FLAQUITA DE LA PAZ  Date:     02/24/18  Time:    08:53              Narrative:    CT of the lumbar spine without contrast.    Comparison: MRI lumbar spine 6/11/2010    Technique: Axial 3 mm images the lumbar spine were obtained.  No IV contrast.  Coronal and sagittal reformations are generated.      Findings:  There is grade 1 anterolisthesis of L4 on L5.  Otherwise, thoracolumbar vertebral body alignment appears to be within normal limits.  There is  anterior ossification along the visualized lower thoracic spine, possibly relating to component of diffuse idiopathic skeletal hyperostosis.  There is compression fracture deformity involving the L4 vertebral body, which was not present on prior MRI examinations, and is age-indeterminate.  Further evaluation with MRI can be performed for additional characterization.  There is no significant retropulsion of fracture fragments.    Remaining vertebral body heights appear maintained.  Thoracic intervertebral disc heights appear relatively maintained.  There is a T10 vertebral body hemangioma.  There is degenerative change of the lumbar spine, most pronounced at the lower levels were there is posterior disc bulges and significant bilateral facet arthropathy resulting in varying degrees of spinal canal or neural foraminal narrowing.    Visualized soft tissue structures demonstrate coronary and aortic atherosclerosis.  There is colonic diverticulosis without surrounding inflammatory change of diverticulitis.  A left renal hypodensity demonstrates attenuation compatible with a cyst.                                 Medical Decision Making:   History:   Old Medical Records: I decided to obtain old medical records.  Independently Interpreted Test(s):   I have ordered 
Nephrology
Infectious Disease
and independently interpreted X-rays - see prior notes.  Clinical Tests:   Radiological Study: Ordered and Reviewed  ED Management:  7:57 AM  Patient states she is feeling better after medications were received.             Scribe Attestation:   Scribe #1: I performed the above scribed service and the documentation accurately describes the services I performed. I attest to the accuracy of the note.    I, Dr. Kellie Gordon, personally performed the services described in this documentation. All medical record entries made by the scribe were at my direction and in my presence.  I have reviewed the chart and agree that the record reflects my personal performance and is accurate and complete. Kellie Gordon MD.  6:50 PM 03/14/2018             Clinical Impression:   The primary encounter diagnosis was Closed compression fracture of fourth lumbar vertebra, initial encounter. Diagnoses of Acute bilateral low back pain without sciatica and Fall, initial encounter were also pertinent to this visit.    Disposition:   Disposition: Discharged  Condition: Stable                        Kellie Gordon MD  03/14/18 0678    
Infectious Disease
Nephrology
Internal Medicine
Internal Medicine

## 2021-04-19 NOTE — PROGRESS NOTE ADULT - NSICDXPILOT_GEN_ALL_CORE
[place]

## 2021-04-19 NOTE — PROGRESS NOTE ADULT - REASON FOR ADMISSION
Sepsis and suspected PE

## 2021-04-21 LAB
CULTURE RESULTS: SIGNIFICANT CHANGE UP
CULTURE RESULTS: SIGNIFICANT CHANGE UP
SPECIMEN SOURCE: SIGNIFICANT CHANGE UP
SPECIMEN SOURCE: SIGNIFICANT CHANGE UP

## 2023-08-31 NOTE — ED PROVIDER NOTE - CARE TRANSITIONED TO INPATIENT TEAM AT:
Notified trauma APRN about pt's bilateral arm weakness and c/o tingling. STAT MRI ordered of C spine. Pt does c/o neck tenderness. Bed rest ordered.    14-Apr-2021 00:46

## 2024-07-16 NOTE — PROGRESS NOTE ADULT - ATTENDING SUPERVISION STATEMENT
"Hello,    The following message was sent via e-mail to the leadership team:     Please advise if you can help facilitate the following overbook request:    Patient Name: Nancy Maciel    Patient MRN: [id number]    Call back #: [phone]    Insurance: N/A    Department:Cardiology    Speciality: Heart Failure    Reason for overbook request: OTHER (PLEASE WRITE REASON IN COMMENT SECTION) Received a call from Elana/ care management RN team advising she was assisting a Turkish patient to set up HF appt after being discharged from ED. Advised 24-72 hours after discharged. She stated it had to be in [facility]. Offered virtual visit as well ( nothing sooner in time frame) Offered 2 appts this week but needs to be before 3pm, needs a  in person. Elana did not think she was a good candidate for virtual but agreed to virtual visit w/ PA- Daily on 7/22 at 10:30am. Is it possible to get in sooner? Patient also only speaks Turkish    Comments (Write \"N/a\" if no comments): Checked all NP, PA's in the area and even Dr. Osborne and Alina Rivera. Elana advised no traveling for patient, not a good candidate     Requested doctor and location: Any HF providers, preferably [facility].    Date of current appointment: 7/22 at 10:30am      Thank you.    "
Resident

## 2025-07-31 NOTE — ED ADULT TRIAGE NOTE - INTERNATIONAL TRAVEL
[facility] EMERGENCY DEPARTMENT  EMERGENCY DEPARTMENT ENCOUNTER      Patient Name: Alek Luna  MRN: [id number]  Birthdate 1940  Date of Evaluation: 7/30/2025  Physician: Augustus Kate MD    CHIEF COMPLAINT       Chief Complaint   Patient presents with    Head Injury    Fall       HISTORY OF PRESENT ILLNESS   (Location/Symptom, Timing/Onset, Context/Setting, Quality, Duration, Modifying Factors, Severity)   Alek Luna, 84 y.o., male     84-year-old male presents with chief complaint will fall. Patient was outside watering the plants tripped and fell striking his head. He also complains of some chest pain which he attributes to possibly damaging his \"cartilage.\" He is not on any blood thinners. Denies loss of consciousness.          Nursing Notes were reviewed.    REVIEW OF SYSTEMS    (Not required)   Review of Systems    Except as noted above the remainder of the review of systems was reviewed and negative.     PAST MEDICAL HISTORY     Past Medical History:   Diagnosis Date    Diabetes mellitus (HCC)     Hemorrhagic stroke (HCC)     Hyperlipidemia     Hypertension        SURGICAL HISTORY       Past Surgical History:   Procedure Laterality Date    CHOLECYSTECTOMY      SKIN BIOPSY      TONSILLECTOMY         CURRENT MEDICATIONS       Previous Medications    AMLODIPINE (NORVASC) 5 MG TABLET    Take 1 tablet by mouth daily    ATORVASTATIN (LIPITOR) 80 MG TABLET    Take 1 tablet by mouth daily    LOSARTAN-HYDROCHLOROTHIAZIDE (HYZAAR) 100-25 MG PER TABLET    Take 1 tablet by mouth daily    METFORMIN (GLUCOPHAGE) 500 MG TABLET    Take 1 tablet by mouth 2 times daily (with meals)    SPIRONOLACTONE (ALDACTONE) 25 MG TABLET    Take 1 tablet by mouth daily    TAMSULOSIN (FLOMAX) 0.4 MG CAPSULE    Take 2 capsules by mouth daily       ALLERGIES     Sulfa antibiotics    FAMILY HISTORY     No family history on file.     SOCIAL HISTORY       Social History     Socioeconomic History    Marital status: 
No

## 2025-08-17 RX ADMIN — Medication 25 GRAM(S): at 17:00

## 2025-08-27 ENCOUNTER — INPATIENT (INPATIENT)
Facility: HOSPITAL | Age: 70
LOS: 6 days | Discharge: HOME | DRG: 853 | End: 2025-09-03
Attending: STUDENT IN AN ORGANIZED HEALTH CARE EDUCATION/TRAINING PROGRAM | Admitting: STUDENT IN AN ORGANIZED HEALTH CARE EDUCATION/TRAINING PROGRAM
Payer: MEDICAID

## 2025-08-27 VITALS
SYSTOLIC BLOOD PRESSURE: 126 MMHG | DIASTOLIC BLOOD PRESSURE: 65 MMHG | TEMPERATURE: 98 F | OXYGEN SATURATION: 97 % | RESPIRATION RATE: 18 BRPM | HEART RATE: 135 BPM | WEIGHT: 281.97 LBS

## 2025-08-27 DIAGNOSIS — Z90.49 ACQUIRED ABSENCE OF OTHER SPECIFIED PARTS OF DIGESTIVE TRACT: Chronic | ICD-10-CM

## 2025-08-27 DIAGNOSIS — N20.0 CALCULUS OF KIDNEY: ICD-10-CM

## 2025-08-27 PROBLEM — I10 ESSENTIAL (PRIMARY) HYPERTENSION: Chronic | Status: ACTIVE | Noted: 2021-04-14

## 2025-08-27 LAB
ALBUMIN SERPL ELPH-MCNC: 2.6 G/DL — LOW (ref 3.5–5)
ALBUMIN SERPL ELPH-MCNC: 3.3 G/DL — LOW (ref 3.5–5)
ALP SERPL-CCNC: 100 U/L — SIGNIFICANT CHANGE UP (ref 40–120)
ALP SERPL-CCNC: 79 U/L — SIGNIFICANT CHANGE UP (ref 40–120)
ALT FLD-CCNC: 17 U/L DA — SIGNIFICANT CHANGE UP (ref 10–60)
ALT FLD-CCNC: 18 U/L DA — SIGNIFICANT CHANGE UP (ref 10–60)
ANION GAP SERPL CALC-SCNC: 12 MMOL/L — SIGNIFICANT CHANGE UP (ref 5–17)
ANION GAP SERPL CALC-SCNC: 5 MMOL/L — SIGNIFICANT CHANGE UP (ref 5–17)
ANION GAP SERPL CALC-SCNC: 9 MMOL/L — SIGNIFICANT CHANGE UP (ref 5–17)
APPEARANCE UR: ABNORMAL
AST SERPL-CCNC: 27 U/L — SIGNIFICANT CHANGE UP (ref 10–40)
AST SERPL-CCNC: 30 U/L — SIGNIFICANT CHANGE UP (ref 10–40)
BACTERIA # UR AUTO: ABNORMAL /HPF
BASE EXCESS BLDV CALC-SCNC: -4.9 MMOL/L — SIGNIFICANT CHANGE UP
BASE EXCESS BLDV CALC-SCNC: -5.2 MMOL/L — SIGNIFICANT CHANGE UP
BASOPHILS # BLD AUTO: 0 K/UL — SIGNIFICANT CHANGE UP (ref 0–0.2)
BASOPHILS # BLD AUTO: 0.05 K/UL — SIGNIFICANT CHANGE UP (ref 0–0.2)
BASOPHILS NFR BLD AUTO: 0 % — SIGNIFICANT CHANGE UP (ref 0–2)
BASOPHILS NFR BLD AUTO: 0.3 % — SIGNIFICANT CHANGE UP (ref 0–2)
BILIRUB SERPL-MCNC: 0.6 MG/DL — SIGNIFICANT CHANGE UP (ref 0.2–1.2)
BILIRUB SERPL-MCNC: 0.7 MG/DL — SIGNIFICANT CHANGE UP (ref 0.2–1.2)
BILIRUB UR-MCNC: NEGATIVE — SIGNIFICANT CHANGE UP
BLD GP AB SCN SERPL QL: SIGNIFICANT CHANGE UP
BUN SERPL-MCNC: 22 MG/DL — HIGH (ref 7–18)
BUN SERPL-MCNC: 23 MG/DL — HIGH (ref 7–18)
BUN SERPL-MCNC: 24 MG/DL — HIGH (ref 7–18)
CA-I SERPL-SCNC: SIGNIFICANT CHANGE UP MMOL/L (ref 1.15–1.33)
CALCIUM SERPL-MCNC: 8.5 MG/DL — SIGNIFICANT CHANGE UP (ref 8.4–10.5)
CALCIUM SERPL-MCNC: 8.7 MG/DL — SIGNIFICANT CHANGE UP (ref 8.4–10.5)
CALCIUM SERPL-MCNC: 8.8 MG/DL — SIGNIFICANT CHANGE UP (ref 8.4–10.5)
CHLORIDE SERPL-SCNC: 106 MMOL/L — SIGNIFICANT CHANGE UP (ref 96–108)
CHLORIDE SERPL-SCNC: 106 MMOL/L — SIGNIFICANT CHANGE UP (ref 96–108)
CHLORIDE SERPL-SCNC: 107 MMOL/L — SIGNIFICANT CHANGE UP (ref 96–108)
CK MB BLD-MCNC: 4.4 % — HIGH (ref 0–3.5)
CK MB CFR SERPL CALC: 6.9 NG/ML — HIGH (ref 0–3.6)
CK SERPL-CCNC: 156 U/L — SIGNIFICANT CHANGE UP (ref 21–215)
CO2 SERPL-SCNC: 20 MMOL/L — LOW (ref 22–31)
CO2 SERPL-SCNC: 22 MMOL/L — SIGNIFICANT CHANGE UP (ref 22–31)
CO2 SERPL-SCNC: 26 MMOL/L — SIGNIFICANT CHANGE UP (ref 22–31)
COLOR SPEC: YELLOW — SIGNIFICANT CHANGE UP
CREAT SERPL-MCNC: 2.1 MG/DL — HIGH (ref 0.5–1.3)
CREAT SERPL-MCNC: 2.55 MG/DL — HIGH (ref 0.5–1.3)
CREAT SERPL-MCNC: 2.69 MG/DL — HIGH (ref 0.5–1.3)
DIFF PNL FLD: ABNORMAL
EGFR: 19 ML/MIN/1.73M2 — LOW
EGFR: 19 ML/MIN/1.73M2 — LOW
EGFR: 20 ML/MIN/1.73M2 — LOW
EGFR: 20 ML/MIN/1.73M2 — LOW
EGFR: 25 ML/MIN/1.73M2 — LOW
EGFR: 25 ML/MIN/1.73M2 — LOW
EOSINOPHIL # BLD AUTO: 0 K/UL — SIGNIFICANT CHANGE UP (ref 0–0.5)
EOSINOPHIL # BLD AUTO: 0.05 K/UL — SIGNIFICANT CHANGE UP (ref 0–0.5)
EOSINOPHIL NFR BLD AUTO: 0 % — SIGNIFICANT CHANGE UP (ref 0–6)
EOSINOPHIL NFR BLD AUTO: 0.3 % — SIGNIFICANT CHANGE UP (ref 0–6)
EPI CELLS # UR: PRESENT
FLUAV AG NPH QL: SIGNIFICANT CHANGE UP
FLUBV AG NPH QL: SIGNIFICANT CHANGE UP
GAS PNL BLDV: 134 MMOL/L — LOW (ref 136–145)
GAS PNL BLDV: 135 MMOL/L — LOW (ref 136–145)
GAS PNL BLDV: SIGNIFICANT CHANGE UP
GAS PNL BLDV: SIGNIFICANT CHANGE UP
GLUCOSE BLDV-MCNC: 146 MG/DL — HIGH (ref 70–99)
GLUCOSE BLDV-MCNC: 198 MG/DL — HIGH (ref 70–99)
GLUCOSE SERPL-MCNC: 110 MG/DL — HIGH (ref 70–99)
GLUCOSE SERPL-MCNC: 130 MG/DL — HIGH (ref 70–99)
GLUCOSE SERPL-MCNC: 139 MG/DL — HIGH (ref 70–99)
GLUCOSE UR QL: 250 MG/DL
HCG SERPL-ACNC: 4 MIU/ML — SIGNIFICANT CHANGE UP
HCO3 BLDV-SCNC: 20 MMOL/L — LOW (ref 22–29)
HCO3 BLDV-SCNC: 22 MMOL/L — SIGNIFICANT CHANGE UP (ref 22–29)
HCT VFR BLD CALC: 35.7 % — SIGNIFICANT CHANGE UP (ref 34.5–45)
HCT VFR BLD CALC: 39.4 % — SIGNIFICANT CHANGE UP (ref 34.5–45)
HCT VFR BLD CALC: 40.1 % — SIGNIFICANT CHANGE UP (ref 34.5–45)
HGB BLD-MCNC: 11.5 G/DL — SIGNIFICANT CHANGE UP (ref 11.5–15.5)
HGB BLD-MCNC: 12.8 G/DL — SIGNIFICANT CHANGE UP (ref 11.5–15.5)
HGB BLD-MCNC: 13.1 G/DL — SIGNIFICANT CHANGE UP (ref 11.5–15.5)
HOROWITZ INDEX BLDV+IHG-RTO: 21 — SIGNIFICANT CHANGE UP
IMM GRANULOCYTES NFR BLD AUTO: 0.3 % — SIGNIFICANT CHANGE UP (ref 0–0.9)
KETONES UR QL: NEGATIVE MG/DL — SIGNIFICANT CHANGE UP
LACTATE BLDV-MCNC: 5.3 MMOL/L — CRITICAL HIGH (ref 0.5–2)
LACTATE BLDV-MCNC: 5.9 MMOL/L — CRITICAL HIGH (ref 0.5–2)
LACTATE SERPL-SCNC: 4.4 MMOL/L — CRITICAL HIGH (ref 0.7–2)
LACTATE SERPL-SCNC: 6 MMOL/L — CRITICAL HIGH (ref 0.7–2)
LEUKOCYTE ESTERASE UR-ACNC: ABNORMAL
LIDOCAIN IGE QN: 84 U/L — HIGH (ref 13–75)
LYMPHOCYTES # BLD AUTO: 0.27 K/UL — LOW (ref 1–3.3)
LYMPHOCYTES # BLD AUTO: 0.89 K/UL — LOW (ref 1–3.3)
LYMPHOCYTES # BLD AUTO: 1 % — LOW (ref 13–44)
LYMPHOCYTES # BLD AUTO: 5.8 % — LOW (ref 13–44)
MAGNESIUM SERPL-MCNC: 1.4 MG/DL — LOW (ref 1.6–2.6)
MANUAL SMEAR VERIFICATION: SIGNIFICANT CHANGE UP
MCHC RBC-ENTMCNC: 30.7 PG — SIGNIFICANT CHANGE UP (ref 27–34)
MCHC RBC-ENTMCNC: 31 PG — SIGNIFICANT CHANGE UP (ref 27–34)
MCHC RBC-ENTMCNC: 31.4 PG — SIGNIFICANT CHANGE UP (ref 27–34)
MCHC RBC-ENTMCNC: 32.2 G/DL — SIGNIFICANT CHANGE UP (ref 32–36)
MCHC RBC-ENTMCNC: 32.5 G/DL — SIGNIFICANT CHANGE UP (ref 32–36)
MCHC RBC-ENTMCNC: 32.7 G/DL — SIGNIFICANT CHANGE UP (ref 32–36)
MCV RBC AUTO: 94.8 FL — SIGNIFICANT CHANGE UP (ref 80–100)
MCV RBC AUTO: 95.2 FL — SIGNIFICANT CHANGE UP (ref 80–100)
MCV RBC AUTO: 96.8 FL — SIGNIFICANT CHANGE UP (ref 80–100)
METAMYELOCYTES # FLD: 1 % — HIGH (ref 0–0)
METAMYELOCYTES NFR BLD: 1 % — HIGH (ref 0–0)
MONOCYTES # BLD AUTO: 1.06 K/UL — HIGH (ref 0–0.9)
MONOCYTES # BLD AUTO: 1.08 K/UL — HIGH (ref 0–0.9)
MONOCYTES NFR BLD AUTO: 4 % — SIGNIFICANT CHANGE UP (ref 2–14)
MONOCYTES NFR BLD AUTO: 7 % — SIGNIFICANT CHANGE UP (ref 2–14)
NEUTROPHILS # BLD AUTO: 13.15 K/UL — HIGH (ref 1.8–7.4)
NEUTROPHILS # BLD AUTO: 25.39 K/UL — HIGH (ref 1.8–7.4)
NEUTROPHILS NFR BLD AUTO: 86.3 % — HIGH (ref 43–77)
NEUTROPHILS NFR BLD AUTO: 89 % — HIGH (ref 43–77)
NEUTS BAND # BLD: 5 % — SIGNIFICANT CHANGE UP (ref 0–8)
NEUTS BAND NFR BLD: 5 % — SIGNIFICANT CHANGE UP (ref 0–8)
NITRITE UR-MCNC: NEGATIVE — SIGNIFICANT CHANGE UP
NRBC # BLD: 0 /100 WBCS — SIGNIFICANT CHANGE UP (ref 0–0)
NRBC BLD AUTO-RTO: 0 /100 WBCS — SIGNIFICANT CHANGE UP (ref 0–0)
NRBC BLD AUTO-RTO: 0 /100 WBCS — SIGNIFICANT CHANGE UP (ref 0–0)
NRBC BLD-RTO: 0 /100 WBCS — SIGNIFICANT CHANGE UP (ref 0–0)
NT-PROBNP SERPL-SCNC: 5693 PG/ML — HIGH (ref 0–125)
PCO2 BLDV: 37 MMHG — LOW (ref 39–42)
PCO2 BLDV: 50 MMHG — HIGH (ref 39–42)
PH BLDV: 7.26 — LOW (ref 7.32–7.43)
PH BLDV: 7.34 — SIGNIFICANT CHANGE UP (ref 7.32–7.43)
PH UR: 5 — SIGNIFICANT CHANGE UP (ref 5–8)
PHOSPHATE SERPL-MCNC: 2 MG/DL — LOW (ref 2.5–4.5)
PLAT MORPH BLD: NORMAL — SIGNIFICANT CHANGE UP
PLATELET # BLD AUTO: 191 K/UL — SIGNIFICANT CHANGE UP (ref 150–400)
PLATELET # BLD AUTO: 194 K/UL — SIGNIFICANT CHANGE UP (ref 150–400)
PLATELET # BLD AUTO: 255 K/UL — SIGNIFICANT CHANGE UP (ref 150–400)
PO2 BLDV: 26 MMHG — SIGNIFICANT CHANGE UP
PO2 BLDV: 42 MMHG — SIGNIFICANT CHANGE UP
POTASSIUM BLDV-SCNC: 3.8 MMOL/L — SIGNIFICANT CHANGE UP (ref 3.5–5.1)
POTASSIUM BLDV-SCNC: 3.9 MMOL/L — SIGNIFICANT CHANGE UP (ref 3.5–5.1)
POTASSIUM SERPL-MCNC: 3.5 MMOL/L — SIGNIFICANT CHANGE UP (ref 3.5–5.3)
POTASSIUM SERPL-MCNC: 3.6 MMOL/L — SIGNIFICANT CHANGE UP (ref 3.5–5.3)
POTASSIUM SERPL-MCNC: 4 MMOL/L — SIGNIFICANT CHANGE UP (ref 3.5–5.3)
POTASSIUM SERPL-SCNC: 3.5 MMOL/L — SIGNIFICANT CHANGE UP (ref 3.5–5.3)
POTASSIUM SERPL-SCNC: 3.6 MMOL/L — SIGNIFICANT CHANGE UP (ref 3.5–5.3)
POTASSIUM SERPL-SCNC: 4 MMOL/L — SIGNIFICANT CHANGE UP (ref 3.5–5.3)
PROT SERPL-MCNC: 6.6 G/DL — SIGNIFICANT CHANGE UP (ref 6–8.3)
PROT SERPL-MCNC: 7.7 G/DL — SIGNIFICANT CHANGE UP (ref 6–8.3)
PROT UR-MCNC: 30 MG/DL
RBC # BLD: 3.75 M/UL — LOW (ref 3.8–5.2)
RBC # BLD: 4.07 M/UL — SIGNIFICANT CHANGE UP (ref 3.8–5.2)
RBC # BLD: 4.23 M/UL — SIGNIFICANT CHANGE UP (ref 3.8–5.2)
RBC # FLD: 13.4 % — SIGNIFICANT CHANGE UP (ref 10.3–14.5)
RBC # FLD: 13.5 % — SIGNIFICANT CHANGE UP (ref 10.3–14.5)
RBC # FLD: 14 % — SIGNIFICANT CHANGE UP (ref 10.3–14.5)
RBC BLD AUTO: NORMAL — SIGNIFICANT CHANGE UP
RBC CASTS # UR COMP ASSIST: 20 /HPF — HIGH (ref 0–4)
RSV RNA NPH QL NAA+NON-PROBE: SIGNIFICANT CHANGE UP
SAO2 % BLDV: 42.4 % — SIGNIFICANT CHANGE UP
SAO2 % BLDV: 77.7 % — SIGNIFICANT CHANGE UP
SARS-COV-2 RNA SPEC QL NAA+PROBE: SIGNIFICANT CHANGE UP
SODIUM SERPL-SCNC: 137 MMOL/L — SIGNIFICANT CHANGE UP (ref 135–145)
SODIUM SERPL-SCNC: 138 MMOL/L — SIGNIFICANT CHANGE UP (ref 135–145)
SODIUM SERPL-SCNC: 138 MMOL/L — SIGNIFICANT CHANGE UP (ref 135–145)
SOURCE RESPIRATORY: SIGNIFICANT CHANGE UP
SP GR SPEC: 1.02 — SIGNIFICANT CHANGE UP (ref 1–1.03)
TROPONIN I, HIGH SENSITIVITY RESULT: 2561 NG/L — HIGH
TROPONIN I, HIGH SENSITIVITY RESULT: 3052.6 NG/L — HIGH
UROBILINOGEN FLD QL: 0.2 MG/DL — SIGNIFICANT CHANGE UP (ref 0.2–1)
WBC # BLD: 15.24 K/UL — HIGH (ref 3.8–10.5)
WBC # BLD: 27.01 K/UL — HIGH (ref 3.8–10.5)
WBC # BLD: 5.77 K/UL — SIGNIFICANT CHANGE UP (ref 3.8–10.5)
WBC # FLD AUTO: 15.24 K/UL — HIGH (ref 3.8–10.5)
WBC # FLD AUTO: 27.01 K/UL — HIGH (ref 3.8–10.5)
WBC # FLD AUTO: 5.77 K/UL — SIGNIFICANT CHANGE UP (ref 3.8–10.5)
WBC UR QL: 55 /HPF — HIGH (ref 0–5)

## 2025-08-27 PROCEDURE — 81001 URINALYSIS AUTO W/SCOPE: CPT

## 2025-08-27 PROCEDURE — 52332 CYSTOSCOPY AND TREATMENT: CPT | Mod: RT

## 2025-08-27 PROCEDURE — 83880 ASSAY OF NATRIURETIC PEPTIDE: CPT

## 2025-08-27 PROCEDURE — 82947 ASSAY GLUCOSE BLOOD QUANT: CPT

## 2025-08-27 PROCEDURE — 85027 COMPLETE CBC AUTOMATED: CPT

## 2025-08-27 PROCEDURE — 82330 ASSAY OF CALCIUM: CPT

## 2025-08-27 PROCEDURE — 84100 ASSAY OF PHOSPHORUS: CPT

## 2025-08-27 PROCEDURE — 87637 SARSCOV2&INF A&B&RSV AMP PRB: CPT

## 2025-08-27 PROCEDURE — 36415 COLL VENOUS BLD VENIPUNCTURE: CPT

## 2025-08-27 PROCEDURE — 84702 CHORIONIC GONADOTROPIN TEST: CPT

## 2025-08-27 PROCEDURE — 76000 FLUOROSCOPY <1 HR PHYS/QHP: CPT

## 2025-08-27 PROCEDURE — 84295 ASSAY OF SERUM SODIUM: CPT

## 2025-08-27 PROCEDURE — 80053 COMPREHEN METABOLIC PANEL: CPT

## 2025-08-27 PROCEDURE — 84484 ASSAY OF TROPONIN QUANT: CPT

## 2025-08-27 PROCEDURE — 74177 CT ABD & PELVIS W/CONTRAST: CPT | Mod: 26

## 2025-08-27 PROCEDURE — 82803 BLOOD GASES ANY COMBINATION: CPT

## 2025-08-27 PROCEDURE — 86901 BLOOD TYPING SEROLOGIC RH(D): CPT

## 2025-08-27 PROCEDURE — 85025 COMPLETE CBC W/AUTO DIFF WBC: CPT

## 2025-08-27 PROCEDURE — 84132 ASSAY OF SERUM POTASSIUM: CPT

## 2025-08-27 PROCEDURE — 82550 ASSAY OF CK (CPK): CPT

## 2025-08-27 PROCEDURE — 87040 BLOOD CULTURE FOR BACTERIA: CPT

## 2025-08-27 PROCEDURE — 87086 URINE CULTURE/COLONY COUNT: CPT

## 2025-08-27 PROCEDURE — 71045 X-RAY EXAM CHEST 1 VIEW: CPT | Mod: 26

## 2025-08-27 PROCEDURE — 83605 ASSAY OF LACTIC ACID: CPT

## 2025-08-27 PROCEDURE — 99285 EMERGENCY DEPT VISIT HI MDM: CPT

## 2025-08-27 PROCEDURE — 80048 BASIC METABOLIC PNL TOTAL CA: CPT

## 2025-08-27 PROCEDURE — 83735 ASSAY OF MAGNESIUM: CPT

## 2025-08-27 PROCEDURE — 86850 RBC ANTIBODY SCREEN: CPT

## 2025-08-27 PROCEDURE — 71045 X-RAY EXAM CHEST 1 VIEW: CPT

## 2025-08-27 PROCEDURE — 82553 CREATINE MB FRACTION: CPT

## 2025-08-27 PROCEDURE — 86900 BLOOD TYPING SEROLOGIC ABO: CPT

## 2025-08-27 PROCEDURE — 83690 ASSAY OF LIPASE: CPT

## 2025-08-27 PROCEDURE — 99053 MED SERV 10PM-8AM 24 HR FAC: CPT

## 2025-08-27 PROCEDURE — 74177 CT ABD & PELVIS W/CONTRAST: CPT

## 2025-08-27 DEVICE — URETERAL STENT PERCUFLEX PLUS 6FR 26CM: Type: IMPLANTABLE DEVICE | Site: RIGHT | Status: FUNCTIONAL

## 2025-08-27 DEVICE — URETERAL STENT PERCUFLEX PLUS 6FR 24CM: Type: IMPLANTABLE DEVICE | Site: RIGHT | Status: FUNCTIONAL

## 2025-08-27 DEVICE — GUIDEWIRE SENSOR DUAL-FLEX NITINOL STRAIGHT .035" X 150CM: Type: IMPLANTABLE DEVICE | Site: RIGHT | Status: FUNCTIONAL

## 2025-08-27 DEVICE — STENT URETHRAL PIGTL DBL 6FRX22CM: Type: IMPLANTABLE DEVICE | Site: RIGHT | Status: FUNCTIONAL

## 2025-08-27 DEVICE — URETERAL CATH OPEN END 5FR 70CM: Type: IMPLANTABLE DEVICE | Site: RIGHT | Status: FUNCTIONAL

## 2025-08-27 RX ORDER — LOSARTAN POTASSIUM 100 MG/1
25 TABLET, FILM COATED ORAL DAILY
Refills: 0 | Status: DISCONTINUED | OUTPATIENT
Start: 2025-08-27 | End: 2025-08-27

## 2025-08-27 RX ORDER — SODIUM CHLORIDE 9 G/1000ML
500 INJECTION, SOLUTION INTRAVENOUS ONCE
Refills: 0 | Status: COMPLETED | OUTPATIENT
Start: 2025-08-27 | End: 2025-08-27

## 2025-08-27 RX ORDER — PIPERACILLIN-TAZO-DEXTROSE,ISO 3.375G/5
3.38 IV SOLUTION, PIGGYBACK PREMIX FROZEN(ML) INTRAVENOUS EVERY 8 HOURS
Refills: 0 | Status: DISCONTINUED | OUTPATIENT
Start: 2025-08-28 | End: 2025-08-28

## 2025-08-27 RX ORDER — LOSARTAN POTASSIUM 100 MG/1
1 TABLET, FILM COATED ORAL
Refills: 0 | DISCHARGE

## 2025-08-27 RX ORDER — HYDROMORPHONE/SOD CHLOR,ISO/PF 2 MG/10 ML
0.5 SYRINGE (ML) INJECTION EVERY 4 HOURS
Refills: 0 | Status: DISCONTINUED | OUTPATIENT
Start: 2025-08-27 | End: 2025-08-28

## 2025-08-27 RX ORDER — HYDROMORPHONE/SOD CHLOR,ISO/PF 2 MG/10 ML
2 SYRINGE (ML) INJECTION ONCE
Refills: 0 | Status: DISCONTINUED | OUTPATIENT
Start: 2025-08-27 | End: 2025-08-28

## 2025-08-27 RX ORDER — ACETAMINOPHEN 500 MG/5ML
650 LIQUID (ML) ORAL ONCE
Refills: 0 | Status: DISCONTINUED | OUTPATIENT
Start: 2025-08-27 | End: 2025-09-03

## 2025-08-27 RX ORDER — ROSUVASTATIN CALCIUM 20 MG/1
1 TABLET, FILM COATED ORAL
Refills: 0 | DISCHARGE

## 2025-08-27 RX ORDER — CEFTRIAXONE 500 MG/1
1000 INJECTION, POWDER, FOR SOLUTION INTRAMUSCULAR; INTRAVENOUS EVERY 24 HOURS
Refills: 0 | Status: DISCONTINUED | OUTPATIENT
Start: 2025-08-27 | End: 2025-08-27

## 2025-08-27 RX ORDER — SODIUM CHLORIDE 9 G/1000ML
1000 INJECTION, SOLUTION INTRAVENOUS
Refills: 0 | Status: DISCONTINUED | OUTPATIENT
Start: 2025-08-27 | End: 2025-08-27

## 2025-08-27 RX ORDER — ACETAMINOPHEN 500 MG/5ML
1000 LIQUID (ML) ORAL ONCE
Refills: 0 | Status: COMPLETED | OUTPATIENT
Start: 2025-08-27 | End: 2025-08-27

## 2025-08-27 RX ORDER — SPIRONOLACTONE 25 MG
25 TABLET ORAL DAILY
Refills: 0 | Status: DISCONTINUED | OUTPATIENT
Start: 2025-08-27 | End: 2025-08-27

## 2025-08-27 RX ORDER — ROSUVASTATIN CALCIUM 20 MG/1
5 TABLET, FILM COATED ORAL AT BEDTIME
Refills: 0 | Status: DISCONTINUED | OUTPATIENT
Start: 2025-08-27 | End: 2025-09-03

## 2025-08-27 RX ORDER — IBUPROFEN 200 MG
400 TABLET ORAL ONCE
Refills: 0 | Status: DISCONTINUED | OUTPATIENT
Start: 2025-08-27 | End: 2025-08-27

## 2025-08-27 RX ORDER — ACETAMINOPHEN 500 MG/5ML
1000 LIQUID (ML) ORAL EVERY 6 HOURS
Refills: 0 | Status: DISCONTINUED | OUTPATIENT
Start: 2025-08-27 | End: 2025-09-03

## 2025-08-27 RX ORDER — CEFTRIAXONE 500 MG/1
1000 INJECTION, POWDER, FOR SOLUTION INTRAMUSCULAR; INTRAVENOUS ONCE
Refills: 0 | Status: COMPLETED | OUTPATIENT
Start: 2025-08-27 | End: 2025-08-27

## 2025-08-27 RX ORDER — FENTANYL CITRATE-0.9 % NACL/PF 100MCG/2ML
25 SYRINGE (ML) INTRAVENOUS
Refills: 0 | Status: DISCONTINUED | OUTPATIENT
Start: 2025-08-27 | End: 2025-08-27

## 2025-08-27 RX ORDER — FENTANYL CITRATE-0.9 % NACL/PF 100MCG/2ML
50 SYRINGE (ML) INTRAVENOUS
Refills: 0 | Status: DISCONTINUED | OUTPATIENT
Start: 2025-08-27 | End: 2025-08-27

## 2025-08-27 RX ORDER — SODIUM CHLORIDE 9 G/1000ML
1000 INJECTION, SOLUTION INTRAVENOUS ONCE
Refills: 0 | Status: COMPLETED | OUTPATIENT
Start: 2025-08-27 | End: 2025-08-27

## 2025-08-27 RX ORDER — MAGNESIUM SULFATE 500 MG/ML
2 SYRINGE (ML) INJECTION ONCE
Refills: 0 | Status: COMPLETED | OUTPATIENT
Start: 2025-08-27 | End: 2025-08-17

## 2025-08-27 RX ORDER — ONDANSETRON HCL/PF 4 MG/2 ML
4 VIAL (ML) INJECTION ONCE
Refills: 0 | Status: DISCONTINUED | OUTPATIENT
Start: 2025-08-27 | End: 2025-08-27

## 2025-08-27 RX ORDER — DAPAGLIFLOZIN 5 MG/1
1 TABLET, FILM COATED ORAL
Refills: 0 | DISCHARGE

## 2025-08-27 RX ORDER — HEPARIN SODIUM 1000 [USP'U]/ML
5000 INJECTION INTRAVENOUS; SUBCUTANEOUS EVERY 8 HOURS
Refills: 0 | Status: DISCONTINUED | OUTPATIENT
Start: 2025-08-27 | End: 2025-09-03

## 2025-08-27 RX ORDER — DAPAGLIFLOZIN 5 MG/1
5 TABLET, FILM COATED ORAL DAILY
Refills: 0 | Status: DISCONTINUED | OUTPATIENT
Start: 2025-08-27 | End: 2025-08-27

## 2025-08-27 RX ORDER — PIPERACILLIN-TAZO-DEXTROSE,ISO 3.375G/5
3.38 IV SOLUTION, PIGGYBACK PREMIX FROZEN(ML) INTRAVENOUS ONCE
Refills: 0 | Status: COMPLETED | OUTPATIENT
Start: 2025-08-27 | End: 2025-08-27

## 2025-08-27 RX ORDER — SOD PHOS DI, MONO/K PHOS MONO 250 MG
1 TABLET ORAL ONCE
Refills: 0 | Status: COMPLETED | OUTPATIENT
Start: 2025-08-27 | End: 2025-08-27

## 2025-08-27 RX ORDER — VANCOMYCIN HCL IN 5 % DEXTROSE 1.5G/250ML
2000 PLASTIC BAG, INJECTION (ML) INTRAVENOUS ONCE
Refills: 0 | Status: COMPLETED | OUTPATIENT
Start: 2025-08-27 | End: 2025-08-27

## 2025-08-27 RX ORDER — SPIRONOLACTONE 25 MG
1 TABLET ORAL
Refills: 0 | DISCHARGE

## 2025-08-27 RX ORDER — NOREPINEPHRINE BITARTRATE 8 MG
0.05 SOLUTION INTRAVENOUS
Qty: 8 | Refills: 0 | Status: DISCONTINUED | OUTPATIENT
Start: 2025-08-27 | End: 2025-08-29

## 2025-08-27 RX ADMIN — Medication 1 APPLICATION(S): at 21:45

## 2025-08-27 RX ADMIN — SODIUM CHLORIDE 500 MILLILITER(S): 9 INJECTION, SOLUTION INTRAVENOUS at 17:18

## 2025-08-27 RX ADMIN — Medication 250 MILLIGRAM(S): at 18:05

## 2025-08-27 RX ADMIN — CEFTRIAXONE 100 MILLIGRAM(S): 500 INJECTION, POWDER, FOR SOLUTION INTRAMUSCULAR; INTRAVENOUS at 08:30

## 2025-08-27 RX ADMIN — SODIUM CHLORIDE 1000 MILLILITER(S): 9 INJECTION, SOLUTION INTRAVENOUS at 11:50

## 2025-08-27 RX ADMIN — Medication 1000 MILLILITER(S): at 04:49

## 2025-08-27 RX ADMIN — SODIUM CHLORIDE 1000 MILLILITER(S): 9 INJECTION, SOLUTION INTRAVENOUS at 17:18

## 2025-08-27 RX ADMIN — Medication 400 MILLIGRAM(S): at 04:49

## 2025-08-27 RX ADMIN — ROSUVASTATIN CALCIUM 5 MILLIGRAM(S): 20 TABLET, FILM COATED ORAL at 21:40

## 2025-08-27 RX ADMIN — Medication 25 GRAM(S): at 21:40

## 2025-08-27 RX ADMIN — SODIUM CHLORIDE 170 MILLILITER(S): 9 INJECTION, SOLUTION INTRAVENOUS at 08:36

## 2025-08-27 RX ADMIN — HEPARIN SODIUM 5000 UNIT(S): 1000 INJECTION INTRAVENOUS; SUBCUTANEOUS at 21:40

## 2025-08-27 RX ADMIN — Medication 200 GRAM(S): at 17:20

## 2025-08-27 RX ADMIN — Medication 1000 MILLIGRAM(S): at 05:49

## 2025-08-28 ENCOUNTER — RESULT REVIEW (OUTPATIENT)
Age: 70
End: 2025-08-28

## 2025-08-28 LAB
-  CTX-M RESISTANCE MARKER: SIGNIFICANT CHANGE UP
-  ESBL: SIGNIFICANT CHANGE UP
A1C WITH ESTIMATED AVERAGE GLUCOSE RESULT: 5.3 % — SIGNIFICANT CHANGE UP (ref 4–5.6)
ALBUMIN SERPL ELPH-MCNC: 2.2 G/DL — LOW (ref 3.5–5)
ALP SERPL-CCNC: 86 U/L — SIGNIFICANT CHANGE UP (ref 40–120)
ALT FLD-CCNC: 17 U/L DA — SIGNIFICANT CHANGE UP (ref 10–60)
ANION GAP SERPL CALC-SCNC: 10 MMOL/L — SIGNIFICANT CHANGE UP (ref 5–17)
AST SERPL-CCNC: 36 U/L — SIGNIFICANT CHANGE UP (ref 10–40)
BASOPHILS # BLD AUTO: 0 K/UL — SIGNIFICANT CHANGE UP (ref 0–0.2)
BASOPHILS NFR BLD AUTO: 0 % — SIGNIFICANT CHANGE UP (ref 0–2)
BILIRUB SERPL-MCNC: 0.8 MG/DL — SIGNIFICANT CHANGE UP (ref 0.2–1.2)
BUN SERPL-MCNC: 22 MG/DL — HIGH (ref 7–18)
CALCIUM SERPL-MCNC: 7.3 MG/DL — LOW (ref 8.4–10.5)
CHLORIDE SERPL-SCNC: 109 MMOL/L — HIGH (ref 96–108)
CHOLEST SERPL-MCNC: 88 MG/DL — SIGNIFICANT CHANGE UP
CO2 SERPL-SCNC: 19 MMOL/L — LOW (ref 22–31)
CREAT SERPL-MCNC: 2.17 MG/DL — HIGH (ref 0.5–1.3)
E COLI DNA BLD POS QL NAA+NON-PROBE: SIGNIFICANT CHANGE UP
EGFR: 24 ML/MIN/1.73M2 — LOW
EGFR: 24 ML/MIN/1.73M2 — LOW
ELLIPTOCYTES BLD QL SMEAR: SLIGHT — SIGNIFICANT CHANGE UP
EOSINOPHIL # BLD AUTO: 0 K/UL — SIGNIFICANT CHANGE UP (ref 0–0.5)
EOSINOPHIL NFR BLD AUTO: 0 % — SIGNIFICANT CHANGE UP (ref 0–6)
ESTIMATED AVERAGE GLUCOSE: 105 MG/DL — SIGNIFICANT CHANGE UP (ref 68–114)
GLUCOSE SERPL-MCNC: 121 MG/DL — HIGH (ref 70–99)
GRAM STN FLD: ABNORMAL
GRAM STN FLD: ABNORMAL
HCT VFR BLD CALC: 32.8 % — LOW (ref 34.5–45)
HDLC SERPL-MCNC: 52 MG/DL — SIGNIFICANT CHANGE UP
HGB BLD-MCNC: 10.8 G/DL — LOW (ref 11.5–15.5)
LACTATE SERPL-SCNC: 2 MMOL/L — SIGNIFICANT CHANGE UP (ref 0.7–2)
LACTATE SERPL-SCNC: 2.7 MMOL/L — HIGH (ref 0.7–2)
LACTATE SERPL-SCNC: 3.5 MMOL/L — HIGH (ref 0.7–2)
LDLC SERPL-MCNC: 21 MG/DL — SIGNIFICANT CHANGE UP
LG PLATELETS BLD QL AUTO: SLIGHT — SIGNIFICANT CHANGE UP
LIPID PNL WITH DIRECT LDL SERPL: 21 MG/DL — SIGNIFICANT CHANGE UP
LYMPHOCYTES # BLD AUTO: 1.5 K/UL — SIGNIFICANT CHANGE UP (ref 1–3.3)
LYMPHOCYTES # BLD AUTO: 5 % — LOW (ref 13–44)
MAGNESIUM SERPL-MCNC: 1.6 MG/DL — SIGNIFICANT CHANGE UP (ref 1.6–2.6)
MANUAL SMEAR VERIFICATION: SIGNIFICANT CHANGE UP
MCHC RBC-ENTMCNC: 31 PG — SIGNIFICANT CHANGE UP (ref 27–34)
MCHC RBC-ENTMCNC: 32.9 G/DL — SIGNIFICANT CHANGE UP (ref 32–36)
MCV RBC AUTO: 94.3 FL — SIGNIFICANT CHANGE UP (ref 80–100)
METHOD TYPE: SIGNIFICANT CHANGE UP
MONOCYTES # BLD AUTO: 1.8 K/UL — HIGH (ref 0–0.9)
MONOCYTES NFR BLD AUTO: 6 % — SIGNIFICANT CHANGE UP (ref 2–14)
MRSA PCR RESULT.: DETECTED
NEUTROPHILS # BLD AUTO: 26.74 K/UL — HIGH (ref 1.8–7.4)
NEUTROPHILS NFR BLD AUTO: 87 % — HIGH (ref 43–77)
NEUTS BAND # BLD: 2 % — SIGNIFICANT CHANGE UP (ref 0–8)
NEUTS BAND NFR BLD: 2 % — SIGNIFICANT CHANGE UP (ref 0–8)
NONHDLC SERPL-MCNC: 36 MG/DL — SIGNIFICANT CHANGE UP
NRBC # BLD: 0 /100 WBCS — SIGNIFICANT CHANGE UP (ref 0–0)
NRBC BLD-RTO: 0 /100 WBCS — SIGNIFICANT CHANGE UP (ref 0–0)
OVALOCYTES BLD QL SMEAR: SLIGHT — SIGNIFICANT CHANGE UP
PHOSPHATE SERPL-MCNC: 3 MG/DL — SIGNIFICANT CHANGE UP (ref 2.5–4.5)
PLAT MORPH BLD: NORMAL — SIGNIFICANT CHANGE UP
PLATELET # BLD AUTO: 180 K/UL — SIGNIFICANT CHANGE UP (ref 150–400)
PLATELET COUNT - ESTIMATE: NORMAL — SIGNIFICANT CHANGE UP
POIKILOCYTOSIS BLD QL AUTO: SLIGHT — SIGNIFICANT CHANGE UP
POTASSIUM SERPL-MCNC: 4.3 MMOL/L — SIGNIFICANT CHANGE UP (ref 3.5–5.3)
POTASSIUM SERPL-SCNC: 4.3 MMOL/L — SIGNIFICANT CHANGE UP (ref 3.5–5.3)
PROT SERPL-MCNC: 5.8 G/DL — LOW (ref 6–8.3)
RBC # BLD: 3.48 M/UL — LOW (ref 3.8–5.2)
RBC # FLD: 14 % — SIGNIFICANT CHANGE UP (ref 10.3–14.5)
RBC BLD AUTO: ABNORMAL
S AUREUS DNA NOSE QL NAA+PROBE: DETECTED
SODIUM SERPL-SCNC: 138 MMOL/L — SIGNIFICANT CHANGE UP (ref 135–145)
SPECIMEN SOURCE: SIGNIFICANT CHANGE UP
TRIGL SERPL-MCNC: 69 MG/DL — SIGNIFICANT CHANGE UP
TSH SERPL-MCNC: 0.57 UU/ML — SIGNIFICANT CHANGE UP (ref 0.34–4.82)
WBC # BLD: 30.05 K/UL — HIGH (ref 3.8–10.5)
WBC # FLD AUTO: 30.05 K/UL — HIGH (ref 3.8–10.5)

## 2025-08-28 PROCEDURE — 87640 STAPH A DNA AMP PROBE: CPT

## 2025-08-28 PROCEDURE — 84295 ASSAY OF SERUM SODIUM: CPT

## 2025-08-28 PROCEDURE — 87641 MR-STAPH DNA AMP PROBE: CPT

## 2025-08-28 PROCEDURE — 80061 LIPID PANEL: CPT

## 2025-08-28 PROCEDURE — 87077 CULTURE AEROBIC IDENTIFY: CPT

## 2025-08-28 PROCEDURE — 80048 BASIC METABOLIC PNL TOTAL CA: CPT

## 2025-08-28 PROCEDURE — 82947 ASSAY GLUCOSE BLOOD QUANT: CPT

## 2025-08-28 PROCEDURE — 86850 RBC ANTIBODY SCREEN: CPT

## 2025-08-28 PROCEDURE — 71045 X-RAY EXAM CHEST 1 VIEW: CPT

## 2025-08-28 PROCEDURE — 84443 ASSAY THYROID STIM HORMONE: CPT

## 2025-08-28 PROCEDURE — 76000 FLUOROSCOPY <1 HR PHYS/QHP: CPT

## 2025-08-28 PROCEDURE — 86900 BLOOD TYPING SEROLOGIC ABO: CPT

## 2025-08-28 PROCEDURE — 84100 ASSAY OF PHOSPHORUS: CPT

## 2025-08-28 PROCEDURE — 83605 ASSAY OF LACTIC ACID: CPT

## 2025-08-28 PROCEDURE — 82803 BLOOD GASES ANY COMBINATION: CPT

## 2025-08-28 PROCEDURE — 80053 COMPREHEN METABOLIC PANEL: CPT

## 2025-08-28 PROCEDURE — 87086 URINE CULTURE/COLONY COUNT: CPT

## 2025-08-28 PROCEDURE — 36415 COLL VENOUS BLD VENIPUNCTURE: CPT

## 2025-08-28 PROCEDURE — 84484 ASSAY OF TROPONIN QUANT: CPT

## 2025-08-28 PROCEDURE — 83690 ASSAY OF LIPASE: CPT

## 2025-08-28 PROCEDURE — 84702 CHORIONIC GONADOTROPIN TEST: CPT

## 2025-08-28 PROCEDURE — 99233 SBSQ HOSP IP/OBS HIGH 50: CPT

## 2025-08-28 PROCEDURE — 82550 ASSAY OF CK (CPK): CPT

## 2025-08-28 PROCEDURE — C2617: CPT

## 2025-08-28 PROCEDURE — 83735 ASSAY OF MAGNESIUM: CPT

## 2025-08-28 PROCEDURE — 86901 BLOOD TYPING SEROLOGIC RH(D): CPT

## 2025-08-28 PROCEDURE — 82553 CREATINE MB FRACTION: CPT

## 2025-08-28 PROCEDURE — 87637 SARSCOV2&INF A&B&RSV AMP PRB: CPT

## 2025-08-28 PROCEDURE — 85025 COMPLETE CBC W/AUTO DIFF WBC: CPT

## 2025-08-28 PROCEDURE — 87150 DNA/RNA AMPLIFIED PROBE: CPT

## 2025-08-28 PROCEDURE — 81001 URINALYSIS AUTO W/SCOPE: CPT

## 2025-08-28 PROCEDURE — 83036 HEMOGLOBIN GLYCOSYLATED A1C: CPT

## 2025-08-28 PROCEDURE — 82330 ASSAY OF CALCIUM: CPT

## 2025-08-28 PROCEDURE — 83880 ASSAY OF NATRIURETIC PEPTIDE: CPT

## 2025-08-28 PROCEDURE — 84132 ASSAY OF SERUM POTASSIUM: CPT

## 2025-08-28 PROCEDURE — 74177 CT ABD & PELVIS W/CONTRAST: CPT

## 2025-08-28 PROCEDURE — 87040 BLOOD CULTURE FOR BACTERIA: CPT

## 2025-08-28 PROCEDURE — 85027 COMPLETE CBC AUTOMATED: CPT

## 2025-08-28 RX ORDER — SODIUM CHLORIDE 9 G/1000ML
500 INJECTION, SOLUTION INTRAVENOUS ONCE
Refills: 0 | Status: COMPLETED | OUTPATIENT
Start: 2025-08-28 | End: 2025-08-28

## 2025-08-28 RX ORDER — SODIUM CHLORIDE 9 G/1000ML
1000 INJECTION, SOLUTION INTRAVENOUS ONCE
Refills: 0 | Status: DISCONTINUED | OUTPATIENT
Start: 2025-08-28 | End: 2025-08-28

## 2025-08-28 RX ORDER — SENNA 187 MG
2 TABLET ORAL AT BEDTIME
Refills: 0 | Status: DISCONTINUED | OUTPATIENT
Start: 2025-08-28 | End: 2025-09-03

## 2025-08-28 RX ORDER — ERTAPENEM SODIUM 1 G/1
500 INJECTION, POWDER, LYOPHILIZED, FOR SOLUTION INTRAMUSCULAR; INTRAVENOUS EVERY 24 HOURS
Refills: 0 | Status: DISCONTINUED | OUTPATIENT
Start: 2025-08-28 | End: 2025-09-01

## 2025-08-28 RX ORDER — MUPIROCIN CALCIUM 20 MG/G
1 CREAM TOPICAL
Refills: 0 | Status: COMPLETED | OUTPATIENT
Start: 2025-08-28 | End: 2025-09-02

## 2025-08-28 RX ADMIN — HEPARIN SODIUM 5000 UNIT(S): 1000 INJECTION INTRAVENOUS; SUBCUTANEOUS at 13:38

## 2025-08-28 RX ADMIN — Medication 1 PACKET(S): at 11:02

## 2025-08-28 RX ADMIN — NOREPINEPHRINE BITARTRATE 12 MICROGRAM(S)/KG/MIN: 8 SOLUTION at 05:28

## 2025-08-28 RX ADMIN — Medication 1 APPLICATION(S): at 05:15

## 2025-08-28 RX ADMIN — HEPARIN SODIUM 5000 UNIT(S): 1000 INJECTION INTRAVENOUS; SUBCUTANEOUS at 05:15

## 2025-08-28 RX ADMIN — HEPARIN SODIUM 5000 UNIT(S): 1000 INJECTION INTRAVENOUS; SUBCUTANEOUS at 21:36

## 2025-08-28 RX ADMIN — SODIUM CHLORIDE 500 MILLILITER(S): 9 INJECTION, SOLUTION INTRAVENOUS at 12:51

## 2025-08-28 RX ADMIN — Medication 2 TABLET(S): at 21:39

## 2025-08-28 RX ADMIN — MUPIROCIN CALCIUM 1 APPLICATION(S): 20 CREAM TOPICAL at 21:36

## 2025-08-28 RX ADMIN — ROSUVASTATIN CALCIUM 5 MILLIGRAM(S): 20 TABLET, FILM COATED ORAL at 21:39

## 2025-08-28 RX ADMIN — ERTAPENEM SODIUM 100 MILLIGRAM(S): 1 INJECTION, POWDER, LYOPHILIZED, FOR SOLUTION INTRAMUSCULAR; INTRAVENOUS at 13:36

## 2025-08-28 RX ADMIN — Medication 25 GRAM(S): at 05:15

## 2025-08-29 LAB
-  AMOXICILLIN/CLAVULANIC ACID: SIGNIFICANT CHANGE UP
-  AMPICILLIN/SULBACTAM: SIGNIFICANT CHANGE UP
-  AMPICILLIN/SULBACTAM: SIGNIFICANT CHANGE UP
-  AMPICILLIN: SIGNIFICANT CHANGE UP
-  AMPICILLIN: SIGNIFICANT CHANGE UP
-  AZTREONAM: SIGNIFICANT CHANGE UP
-  AZTREONAM: SIGNIFICANT CHANGE UP
-  CEFAZOLIN: SIGNIFICANT CHANGE UP
-  CEFAZOLIN: SIGNIFICANT CHANGE UP
-  CEFEPIME: SIGNIFICANT CHANGE UP
-  CEFEPIME: SIGNIFICANT CHANGE UP
-  CEFOXITIN: SIGNIFICANT CHANGE UP
-  CEFTRIAXONE: SIGNIFICANT CHANGE UP
-  CEFTRIAXONE: SIGNIFICANT CHANGE UP
-  CEFUROXIME: SIGNIFICANT CHANGE UP
-  CIPROFLOXACIN: SIGNIFICANT CHANGE UP
-  CIPROFLOXACIN: SIGNIFICANT CHANGE UP
-  ERTAPENEM: SIGNIFICANT CHANGE UP
-  ERTAPENEM: SIGNIFICANT CHANGE UP
-  GENTAMICIN: SIGNIFICANT CHANGE UP
-  GENTAMICIN: SIGNIFICANT CHANGE UP
-  IMIPENEM: SIGNIFICANT CHANGE UP
-  IMIPENEM: SIGNIFICANT CHANGE UP
-  LEVOFLOXACIN: SIGNIFICANT CHANGE UP
-  LEVOFLOXACIN: SIGNIFICANT CHANGE UP
-  MEROPENEM: SIGNIFICANT CHANGE UP
-  MEROPENEM: SIGNIFICANT CHANGE UP
-  PIPERACILLIN/TAZOBACTAM: SIGNIFICANT CHANGE UP
-  PIPERACILLIN/TAZOBACTAM: SIGNIFICANT CHANGE UP
-  TIGECYCLINE: SIGNIFICANT CHANGE UP
-  TIGECYCLINE: SIGNIFICANT CHANGE UP
-  TOBRAMYCIN: SIGNIFICANT CHANGE UP
-  TOBRAMYCIN: SIGNIFICANT CHANGE UP
-  TRIMETHOPRIM/SULFAMETHOXAZOLE: SIGNIFICANT CHANGE UP
-  TRIMETHOPRIM/SULFAMETHOXAZOLE: SIGNIFICANT CHANGE UP
ALBUMIN SERPL ELPH-MCNC: 2.1 G/DL — LOW (ref 3.5–5)
ALP SERPL-CCNC: 99 U/L — SIGNIFICANT CHANGE UP (ref 40–120)
ALT FLD-CCNC: 15 U/L DA — SIGNIFICANT CHANGE UP (ref 10–60)
ANION GAP SERPL CALC-SCNC: 5 MMOL/L — SIGNIFICANT CHANGE UP (ref 5–17)
ANISOCYTOSIS BLD QL: SLIGHT — SIGNIFICANT CHANGE UP
AST SERPL-CCNC: 19 U/L — SIGNIFICANT CHANGE UP (ref 10–40)
BASOPHILS # BLD AUTO: 0 K/UL — SIGNIFICANT CHANGE UP (ref 0–0.2)
BASOPHILS NFR BLD AUTO: 0 % — SIGNIFICANT CHANGE UP (ref 0–2)
BILIRUB SERPL-MCNC: 0.7 MG/DL — SIGNIFICANT CHANGE UP (ref 0.2–1.2)
BUN SERPL-MCNC: 28 MG/DL — HIGH (ref 7–18)
CALCIUM SERPL-MCNC: 8.4 MG/DL — SIGNIFICANT CHANGE UP (ref 8.4–10.5)
CHLORIDE SERPL-SCNC: 108 MMOL/L — SIGNIFICANT CHANGE UP (ref 96–108)
CO2 SERPL-SCNC: 23 MMOL/L — SIGNIFICANT CHANGE UP (ref 22–31)
CREAT SERPL-MCNC: 2.07 MG/DL — HIGH (ref 0.5–1.3)
CULTURE RESULTS: ABNORMAL
CULTURE RESULTS: ABNORMAL
DACRYOCYTES BLD QL SMEAR: SLIGHT — SIGNIFICANT CHANGE UP
EGFR: 25 ML/MIN/1.73M2 — LOW
EGFR: 25 ML/MIN/1.73M2 — LOW
EOSINOPHIL # BLD AUTO: 0 K/UL — SIGNIFICANT CHANGE UP (ref 0–0.5)
EOSINOPHIL NFR BLD AUTO: 0 % — SIGNIFICANT CHANGE UP (ref 0–6)
GLUCOSE SERPL-MCNC: 113 MG/DL — HIGH (ref 70–99)
GRAM STN FLD: ABNORMAL
HCT VFR BLD CALC: 31.7 % — LOW (ref 34.5–45)
HGB BLD-MCNC: 10.5 G/DL — LOW (ref 11.5–15.5)
LG PLATELETS BLD QL AUTO: SLIGHT — SIGNIFICANT CHANGE UP
LYMPHOCYTES # BLD AUTO: 0.98 K/UL — LOW (ref 1–3.3)
LYMPHOCYTES # BLD AUTO: 5 % — LOW (ref 13–44)
MACROCYTES BLD QL: SLIGHT — SIGNIFICANT CHANGE UP
MAGNESIUM SERPL-MCNC: 1.9 MG/DL — SIGNIFICANT CHANGE UP (ref 1.6–2.6)
MCHC RBC-ENTMCNC: 31.2 PG — SIGNIFICANT CHANGE UP (ref 27–34)
MCHC RBC-ENTMCNC: 33.1 G/DL — SIGNIFICANT CHANGE UP (ref 32–36)
MCV RBC AUTO: 94.1 FL — SIGNIFICANT CHANGE UP (ref 80–100)
METHOD TYPE: SIGNIFICANT CHANGE UP
METHOD TYPE: SIGNIFICANT CHANGE UP
MICROCYTES BLD QL: SLIGHT — SIGNIFICANT CHANGE UP
MONOCYTES # BLD AUTO: 0.79 K/UL — SIGNIFICANT CHANGE UP (ref 0–0.9)
MONOCYTES NFR BLD AUTO: 4 % — SIGNIFICANT CHANGE UP (ref 2–14)
NEUTROPHILS # BLD AUTO: 17.86 K/UL — HIGH (ref 1.8–7.4)
NEUTROPHILS NFR BLD AUTO: 91 % — HIGH (ref 43–77)
NRBC # BLD: 0 /100 WBCS — SIGNIFICANT CHANGE UP (ref 0–0)
NRBC BLD-RTO: 0 /100 WBCS — SIGNIFICANT CHANGE UP (ref 0–0)
ORGANISM # SPEC MICROSCOPIC CNT: ABNORMAL
OVALOCYTES BLD QL SMEAR: SLIGHT — SIGNIFICANT CHANGE UP
PHOSPHATE SERPL-MCNC: 2.7 MG/DL — SIGNIFICANT CHANGE UP (ref 2.5–4.5)
PLAT MORPH BLD: NORMAL — SIGNIFICANT CHANGE UP
PLATELET # BLD AUTO: 171 K/UL — SIGNIFICANT CHANGE UP (ref 150–400)
POIKILOCYTOSIS BLD QL AUTO: SLIGHT — SIGNIFICANT CHANGE UP
POTASSIUM SERPL-MCNC: 3.8 MMOL/L — SIGNIFICANT CHANGE UP (ref 3.5–5.3)
POTASSIUM SERPL-SCNC: 3.8 MMOL/L — SIGNIFICANT CHANGE UP (ref 3.5–5.3)
PROT SERPL-MCNC: 6.1 G/DL — SIGNIFICANT CHANGE UP (ref 6–8.3)
RBC # BLD: 3.37 M/UL — LOW (ref 3.8–5.2)
RBC # FLD: 14.2 % — SIGNIFICANT CHANGE UP (ref 10.3–14.5)
RBC BLD AUTO: ABNORMAL
SODIUM SERPL-SCNC: 136 MMOL/L — SIGNIFICANT CHANGE UP (ref 135–145)
SPECIMEN SOURCE: SIGNIFICANT CHANGE UP
SPECIMEN SOURCE: SIGNIFICANT CHANGE UP
WBC # BLD: 19.63 K/UL — HIGH (ref 3.8–10.5)
WBC # FLD AUTO: 19.63 K/UL — HIGH (ref 3.8–10.5)

## 2025-08-29 PROCEDURE — 86850 RBC ANTIBODY SCREEN: CPT

## 2025-08-29 PROCEDURE — 83605 ASSAY OF LACTIC ACID: CPT

## 2025-08-29 PROCEDURE — 80053 COMPREHEN METABOLIC PANEL: CPT

## 2025-08-29 PROCEDURE — 82553 CREATINE MB FRACTION: CPT

## 2025-08-29 PROCEDURE — 84100 ASSAY OF PHOSPHORUS: CPT

## 2025-08-29 PROCEDURE — 83735 ASSAY OF MAGNESIUM: CPT

## 2025-08-29 PROCEDURE — 86901 BLOOD TYPING SEROLOGIC RH(D): CPT

## 2025-08-29 PROCEDURE — C2617: CPT

## 2025-08-29 PROCEDURE — 84702 CHORIONIC GONADOTROPIN TEST: CPT

## 2025-08-29 PROCEDURE — 87186 SC STD MICRODIL/AGAR DIL: CPT

## 2025-08-29 PROCEDURE — 84295 ASSAY OF SERUM SODIUM: CPT

## 2025-08-29 PROCEDURE — 82330 ASSAY OF CALCIUM: CPT

## 2025-08-29 PROCEDURE — 99232 SBSQ HOSP IP/OBS MODERATE 35: CPT

## 2025-08-29 PROCEDURE — 80061 LIPID PANEL: CPT

## 2025-08-29 PROCEDURE — 74177 CT ABD & PELVIS W/CONTRAST: CPT

## 2025-08-29 PROCEDURE — 81001 URINALYSIS AUTO W/SCOPE: CPT

## 2025-08-29 PROCEDURE — 76000 FLUOROSCOPY <1 HR PHYS/QHP: CPT

## 2025-08-29 PROCEDURE — 82947 ASSAY GLUCOSE BLOOD QUANT: CPT

## 2025-08-29 PROCEDURE — 87077 CULTURE AEROBIC IDENTIFY: CPT

## 2025-08-29 PROCEDURE — 80048 BASIC METABOLIC PNL TOTAL CA: CPT

## 2025-08-29 PROCEDURE — 85025 COMPLETE CBC W/AUTO DIFF WBC: CPT

## 2025-08-29 PROCEDURE — 87640 STAPH A DNA AMP PROBE: CPT

## 2025-08-29 PROCEDURE — 82803 BLOOD GASES ANY COMBINATION: CPT

## 2025-08-29 PROCEDURE — 82550 ASSAY OF CK (CPK): CPT

## 2025-08-29 PROCEDURE — 84132 ASSAY OF SERUM POTASSIUM: CPT

## 2025-08-29 PROCEDURE — 71045 X-RAY EXAM CHEST 1 VIEW: CPT

## 2025-08-29 PROCEDURE — 87641 MR-STAPH DNA AMP PROBE: CPT

## 2025-08-29 PROCEDURE — 87040 BLOOD CULTURE FOR BACTERIA: CPT

## 2025-08-29 PROCEDURE — 83690 ASSAY OF LIPASE: CPT

## 2025-08-29 PROCEDURE — 87150 DNA/RNA AMPLIFIED PROBE: CPT

## 2025-08-29 PROCEDURE — 87637 SARSCOV2&INF A&B&RSV AMP PRB: CPT

## 2025-08-29 PROCEDURE — 84484 ASSAY OF TROPONIN QUANT: CPT

## 2025-08-29 PROCEDURE — 36415 COLL VENOUS BLD VENIPUNCTURE: CPT

## 2025-08-29 PROCEDURE — 86900 BLOOD TYPING SEROLOGIC ABO: CPT

## 2025-08-29 PROCEDURE — 84443 ASSAY THYROID STIM HORMONE: CPT

## 2025-08-29 PROCEDURE — 85027 COMPLETE CBC AUTOMATED: CPT

## 2025-08-29 PROCEDURE — 93005 ELECTROCARDIOGRAM TRACING: CPT

## 2025-08-29 PROCEDURE — 83880 ASSAY OF NATRIURETIC PEPTIDE: CPT

## 2025-08-29 PROCEDURE — 83036 HEMOGLOBIN GLYCOSYLATED A1C: CPT

## 2025-08-29 PROCEDURE — 87086 URINE CULTURE/COLONY COUNT: CPT

## 2025-08-29 RX ORDER — TAMSULOSIN HYDROCHLORIDE 0.4 MG/1
0.4 CAPSULE ORAL DAILY
Refills: 0 | Status: DISCONTINUED | OUTPATIENT
Start: 2025-08-29 | End: 2025-09-03

## 2025-08-29 RX ADMIN — ROSUVASTATIN CALCIUM 5 MILLIGRAM(S): 20 TABLET, FILM COATED ORAL at 22:23

## 2025-08-29 RX ADMIN — HEPARIN SODIUM 5000 UNIT(S): 1000 INJECTION INTRAVENOUS; SUBCUTANEOUS at 22:23

## 2025-08-29 RX ADMIN — ERTAPENEM SODIUM 100 MILLIGRAM(S): 1 INJECTION, POWDER, LYOPHILIZED, FOR SOLUTION INTRAMUSCULAR; INTRAVENOUS at 11:01

## 2025-08-29 RX ADMIN — MUPIROCIN CALCIUM 1 APPLICATION(S): 20 CREAM TOPICAL at 05:41

## 2025-08-29 RX ADMIN — HEPARIN SODIUM 5000 UNIT(S): 1000 INJECTION INTRAVENOUS; SUBCUTANEOUS at 13:02

## 2025-08-29 RX ADMIN — MUPIROCIN CALCIUM 1 APPLICATION(S): 20 CREAM TOPICAL at 17:23

## 2025-08-29 RX ADMIN — HEPARIN SODIUM 5000 UNIT(S): 1000 INJECTION INTRAVENOUS; SUBCUTANEOUS at 05:40

## 2025-08-29 RX ADMIN — Medication 1000 MILLIGRAM(S): at 02:57

## 2025-08-29 RX ADMIN — TAMSULOSIN HYDROCHLORIDE 0.4 MILLIGRAM(S): 0.4 CAPSULE ORAL at 12:29

## 2025-08-29 RX ADMIN — Medication 400 MILLIGRAM(S): at 02:55

## 2025-08-29 RX ADMIN — Medication 1 APPLICATION(S): at 05:34

## 2025-08-29 RX ADMIN — Medication 2 TABLET(S): at 22:23

## 2025-08-30 LAB
-  AMPICILLIN/SULBACTAM: SIGNIFICANT CHANGE UP
-  AMPICILLIN: SIGNIFICANT CHANGE UP
-  AZTREONAM: SIGNIFICANT CHANGE UP
-  CEFAZOLIN: SIGNIFICANT CHANGE UP
-  CEFEPIME: SIGNIFICANT CHANGE UP
-  CEFTRIAXONE: SIGNIFICANT CHANGE UP
-  CEFUROXIME: SIGNIFICANT CHANGE UP
-  CIPROFLOXACIN: SIGNIFICANT CHANGE UP
-  ERTAPENEM: SIGNIFICANT CHANGE UP
-  GENTAMICIN: SIGNIFICANT CHANGE UP
-  IMIPENEM: SIGNIFICANT CHANGE UP
-  LEVOFLOXACIN: SIGNIFICANT CHANGE UP
-  MEROPENEM: SIGNIFICANT CHANGE UP
-  NITROFURANTOIN: SIGNIFICANT CHANGE UP
-  PIPERACILLIN/TAZOBACTAM: SIGNIFICANT CHANGE UP
-  TIGECYCLINE: SIGNIFICANT CHANGE UP
-  TOBRAMYCIN: SIGNIFICANT CHANGE UP
-  TRIMETHOPRIM/SULFAMETHOXAZOLE: SIGNIFICANT CHANGE UP
ALBUMIN SERPL ELPH-MCNC: 2.2 G/DL — LOW (ref 3.5–5)
ALP SERPL-CCNC: 192 U/L — HIGH (ref 40–120)
ALT FLD-CCNC: 19 U/L DA — SIGNIFICANT CHANGE UP (ref 10–60)
ANION GAP SERPL CALC-SCNC: 7 MMOL/L — SIGNIFICANT CHANGE UP (ref 5–17)
AST SERPL-CCNC: 21 U/L — SIGNIFICANT CHANGE UP (ref 10–40)
BASOPHILS # BLD AUTO: 0.09 K/UL — SIGNIFICANT CHANGE UP (ref 0–0.2)
BASOPHILS NFR BLD AUTO: 0.6 % — SIGNIFICANT CHANGE UP (ref 0–2)
BILIRUB SERPL-MCNC: 0.9 MG/DL — SIGNIFICANT CHANGE UP (ref 0.2–1.2)
BUN SERPL-MCNC: 28 MG/DL — HIGH (ref 7–18)
CALCIUM SERPL-MCNC: 8.8 MG/DL — SIGNIFICANT CHANGE UP (ref 8.4–10.5)
CHLORIDE SERPL-SCNC: 107 MMOL/L — SIGNIFICANT CHANGE UP (ref 96–108)
CO2 SERPL-SCNC: 25 MMOL/L — SIGNIFICANT CHANGE UP (ref 22–31)
CREAT SERPL-MCNC: 1.74 MG/DL — HIGH (ref 0.5–1.3)
CULTURE RESULTS: ABNORMAL
CULTURE RESULTS: ABNORMAL
EGFR: 31 ML/MIN/1.73M2 — LOW
EGFR: 31 ML/MIN/1.73M2 — LOW
EOSINOPHIL # BLD AUTO: 0.24 K/UL — SIGNIFICANT CHANGE UP (ref 0–0.5)
EOSINOPHIL NFR BLD AUTO: 1.5 % — SIGNIFICANT CHANGE UP (ref 0–6)
GLUCOSE SERPL-MCNC: 93 MG/DL — SIGNIFICANT CHANGE UP (ref 70–99)
HCT VFR BLD CALC: 33.8 % — LOW (ref 34.5–45)
HGB BLD-MCNC: 11 G/DL — LOW (ref 11.5–15.5)
IMM GRANULOCYTES NFR BLD AUTO: 0.6 % — SIGNIFICANT CHANGE UP (ref 0–0.9)
LYMPHOCYTES # BLD AUTO: 1.33 K/UL — SIGNIFICANT CHANGE UP (ref 1–3.3)
LYMPHOCYTES # BLD AUTO: 8.3 % — LOW (ref 13–44)
MAGNESIUM SERPL-MCNC: 2.3 MG/DL — SIGNIFICANT CHANGE UP (ref 1.6–2.6)
MCHC RBC-ENTMCNC: 30.5 PG — SIGNIFICANT CHANGE UP (ref 27–34)
MCHC RBC-ENTMCNC: 32.5 G/DL — SIGNIFICANT CHANGE UP (ref 32–36)
MCV RBC AUTO: 93.6 FL — SIGNIFICANT CHANGE UP (ref 80–100)
METHOD TYPE: SIGNIFICANT CHANGE UP
MONOCYTES # BLD AUTO: 0.93 K/UL — HIGH (ref 0–0.9)
MONOCYTES NFR BLD AUTO: 5.8 % — SIGNIFICANT CHANGE UP (ref 2–14)
NEUTROPHILS # BLD AUTO: 13.38 K/UL — HIGH (ref 1.8–7.4)
NEUTROPHILS NFR BLD AUTO: 83.2 % — HIGH (ref 43–77)
NRBC BLD AUTO-RTO: 0 /100 WBCS — SIGNIFICANT CHANGE UP (ref 0–0)
ORGANISM # SPEC MICROSCOPIC CNT: ABNORMAL
ORGANISM # SPEC MICROSCOPIC CNT: ABNORMAL
PHOSPHATE SERPL-MCNC: 2.5 MG/DL — SIGNIFICANT CHANGE UP (ref 2.5–4.5)
PLATELET # BLD AUTO: 176 K/UL — SIGNIFICANT CHANGE UP (ref 150–400)
POTASSIUM SERPL-MCNC: 3.8 MMOL/L — SIGNIFICANT CHANGE UP (ref 3.5–5.3)
POTASSIUM SERPL-SCNC: 3.8 MMOL/L — SIGNIFICANT CHANGE UP (ref 3.5–5.3)
PROT SERPL-MCNC: 6.7 G/DL — SIGNIFICANT CHANGE UP (ref 6–8.3)
RBC # BLD: 3.61 M/UL — LOW (ref 3.8–5.2)
RBC # FLD: 14.2 % — SIGNIFICANT CHANGE UP (ref 10.3–14.5)
SODIUM SERPL-SCNC: 139 MMOL/L — SIGNIFICANT CHANGE UP (ref 135–145)
SPECIMEN SOURCE: SIGNIFICANT CHANGE UP
SPECIMEN SOURCE: SIGNIFICANT CHANGE UP
WBC # BLD: 16.07 K/UL — HIGH (ref 3.8–10.5)
WBC # FLD AUTO: 16.07 K/UL — HIGH (ref 3.8–10.5)

## 2025-08-30 PROCEDURE — 83880 ASSAY OF NATRIURETIC PEPTIDE: CPT

## 2025-08-30 PROCEDURE — 80048 BASIC METABOLIC PNL TOTAL CA: CPT

## 2025-08-30 PROCEDURE — 85025 COMPLETE CBC W/AUTO DIFF WBC: CPT

## 2025-08-30 PROCEDURE — 83690 ASSAY OF LIPASE: CPT

## 2025-08-30 PROCEDURE — C8929: CPT

## 2025-08-30 PROCEDURE — 83735 ASSAY OF MAGNESIUM: CPT

## 2025-08-30 PROCEDURE — 83036 HEMOGLOBIN GLYCOSYLATED A1C: CPT

## 2025-08-30 PROCEDURE — 87040 BLOOD CULTURE FOR BACTERIA: CPT

## 2025-08-30 PROCEDURE — 82553 CREATINE MB FRACTION: CPT

## 2025-08-30 PROCEDURE — 83605 ASSAY OF LACTIC ACID: CPT

## 2025-08-30 PROCEDURE — 84702 CHORIONIC GONADOTROPIN TEST: CPT

## 2025-08-30 PROCEDURE — 82947 ASSAY GLUCOSE BLOOD QUANT: CPT

## 2025-08-30 PROCEDURE — 36415 COLL VENOUS BLD VENIPUNCTURE: CPT

## 2025-08-30 PROCEDURE — 99232 SBSQ HOSP IP/OBS MODERATE 35: CPT

## 2025-08-30 PROCEDURE — 80053 COMPREHEN METABOLIC PANEL: CPT

## 2025-08-30 PROCEDURE — 87086 URINE CULTURE/COLONY COUNT: CPT

## 2025-08-30 PROCEDURE — 87077 CULTURE AEROBIC IDENTIFY: CPT

## 2025-08-30 PROCEDURE — 97162 PT EVAL MOD COMPLEX 30 MIN: CPT

## 2025-08-30 PROCEDURE — 86850 RBC ANTIBODY SCREEN: CPT

## 2025-08-30 PROCEDURE — 81001 URINALYSIS AUTO W/SCOPE: CPT

## 2025-08-30 PROCEDURE — 74177 CT ABD & PELVIS W/CONTRAST: CPT

## 2025-08-30 PROCEDURE — 84100 ASSAY OF PHOSPHORUS: CPT

## 2025-08-30 PROCEDURE — 84132 ASSAY OF SERUM POTASSIUM: CPT

## 2025-08-30 PROCEDURE — 84484 ASSAY OF TROPONIN QUANT: CPT

## 2025-08-30 PROCEDURE — 84295 ASSAY OF SERUM SODIUM: CPT

## 2025-08-30 PROCEDURE — 76000 FLUOROSCOPY <1 HR PHYS/QHP: CPT

## 2025-08-30 PROCEDURE — 82330 ASSAY OF CALCIUM: CPT

## 2025-08-30 PROCEDURE — 84443 ASSAY THYROID STIM HORMONE: CPT

## 2025-08-30 PROCEDURE — 93005 ELECTROCARDIOGRAM TRACING: CPT

## 2025-08-30 PROCEDURE — 86900 BLOOD TYPING SEROLOGIC ABO: CPT

## 2025-08-30 PROCEDURE — 80061 LIPID PANEL: CPT

## 2025-08-30 PROCEDURE — 87640 STAPH A DNA AMP PROBE: CPT

## 2025-08-30 PROCEDURE — 85027 COMPLETE CBC AUTOMATED: CPT

## 2025-08-30 PROCEDURE — 82550 ASSAY OF CK (CPK): CPT

## 2025-08-30 PROCEDURE — 87186 SC STD MICRODIL/AGAR DIL: CPT

## 2025-08-30 PROCEDURE — 71045 X-RAY EXAM CHEST 1 VIEW: CPT

## 2025-08-30 PROCEDURE — 86901 BLOOD TYPING SEROLOGIC RH(D): CPT

## 2025-08-30 PROCEDURE — 87641 MR-STAPH DNA AMP PROBE: CPT

## 2025-08-30 PROCEDURE — 82803 BLOOD GASES ANY COMBINATION: CPT

## 2025-08-30 PROCEDURE — C2617: CPT

## 2025-08-30 PROCEDURE — 87150 DNA/RNA AMPLIFIED PROBE: CPT

## 2025-08-30 PROCEDURE — 87637 SARSCOV2&INF A&B&RSV AMP PRB: CPT

## 2025-08-30 RX ORDER — DEXTROMETHORPHAN HBR, GUAIFENESIN 200 MG/10ML
600 LIQUID ORAL EVERY 12 HOURS
Refills: 0 | Status: COMPLETED | OUTPATIENT
Start: 2025-08-30 | End: 2025-09-02

## 2025-08-30 RX ADMIN — HEPARIN SODIUM 5000 UNIT(S): 1000 INJECTION INTRAVENOUS; SUBCUTANEOUS at 05:22

## 2025-08-30 RX ADMIN — TAMSULOSIN HYDROCHLORIDE 0.4 MILLIGRAM(S): 0.4 CAPSULE ORAL at 12:26

## 2025-08-30 RX ADMIN — HEPARIN SODIUM 5000 UNIT(S): 1000 INJECTION INTRAVENOUS; SUBCUTANEOUS at 14:54

## 2025-08-30 RX ADMIN — HEPARIN SODIUM 5000 UNIT(S): 1000 INJECTION INTRAVENOUS; SUBCUTANEOUS at 22:14

## 2025-08-30 RX ADMIN — ROSUVASTATIN CALCIUM 5 MILLIGRAM(S): 20 TABLET, FILM COATED ORAL at 22:14

## 2025-08-30 RX ADMIN — DEXTROMETHORPHAN HBR, GUAIFENESIN 600 MILLIGRAM(S): 200 LIQUID ORAL at 17:52

## 2025-08-30 RX ADMIN — ERTAPENEM SODIUM 100 MILLIGRAM(S): 1 INJECTION, POWDER, LYOPHILIZED, FOR SOLUTION INTRAMUSCULAR; INTRAVENOUS at 12:26

## 2025-08-30 RX ADMIN — MUPIROCIN CALCIUM 1 APPLICATION(S): 20 CREAM TOPICAL at 05:22

## 2025-08-30 RX ADMIN — Medication 2 TABLET(S): at 22:14

## 2025-08-30 RX ADMIN — MUPIROCIN CALCIUM 1 APPLICATION(S): 20 CREAM TOPICAL at 17:52

## 2025-08-31 LAB
ALBUMIN SERPL ELPH-MCNC: 2.4 G/DL — LOW (ref 3.5–5)
ALP SERPL-CCNC: 228 U/L — HIGH (ref 40–120)
ALT FLD-CCNC: 18 U/L DA — SIGNIFICANT CHANGE UP (ref 10–60)
ANION GAP SERPL CALC-SCNC: 8 MMOL/L — SIGNIFICANT CHANGE UP (ref 5–17)
AST SERPL-CCNC: 17 U/L — SIGNIFICANT CHANGE UP (ref 10–40)
BASOPHILS # BLD AUTO: 0.06 K/UL — SIGNIFICANT CHANGE UP (ref 0–0.2)
BASOPHILS NFR BLD AUTO: 0.5 % — SIGNIFICANT CHANGE UP (ref 0–2)
BILIRUB SERPL-MCNC: 0.8 MG/DL — SIGNIFICANT CHANGE UP (ref 0.2–1.2)
BUN SERPL-MCNC: 25 MG/DL — HIGH (ref 7–18)
CALCIUM SERPL-MCNC: 9.1 MG/DL — SIGNIFICANT CHANGE UP (ref 8.4–10.5)
CHLORIDE SERPL-SCNC: 105 MMOL/L — SIGNIFICANT CHANGE UP (ref 96–108)
CO2 SERPL-SCNC: 27 MMOL/L — SIGNIFICANT CHANGE UP (ref 22–31)
CREAT SERPL-MCNC: 1.69 MG/DL — HIGH (ref 0.5–1.3)
EGFR: 32 ML/MIN/1.73M2 — LOW
EGFR: 32 ML/MIN/1.73M2 — LOW
EOSINOPHIL # BLD AUTO: 0.27 K/UL — SIGNIFICANT CHANGE UP (ref 0–0.5)
EOSINOPHIL NFR BLD AUTO: 2.3 % — SIGNIFICANT CHANGE UP (ref 0–6)
GLUCOSE SERPL-MCNC: 104 MG/DL — HIGH (ref 70–99)
HCT VFR BLD CALC: 36.6 % — SIGNIFICANT CHANGE UP (ref 34.5–45)
HGB BLD-MCNC: 12 G/DL — SIGNIFICANT CHANGE UP (ref 11.5–15.5)
IMM GRANULOCYTES NFR BLD AUTO: 0.7 % — SIGNIFICANT CHANGE UP (ref 0–0.9)
LYMPHOCYTES # BLD AUTO: 1.37 K/UL — SIGNIFICANT CHANGE UP (ref 1–3.3)
LYMPHOCYTES # BLD AUTO: 11.6 % — LOW (ref 13–44)
MCHC RBC-ENTMCNC: 30.9 PG — SIGNIFICANT CHANGE UP (ref 27–34)
MCHC RBC-ENTMCNC: 32.8 G/DL — SIGNIFICANT CHANGE UP (ref 32–36)
MCV RBC AUTO: 94.3 FL — SIGNIFICANT CHANGE UP (ref 80–100)
MONOCYTES # BLD AUTO: 0.97 K/UL — HIGH (ref 0–0.9)
MONOCYTES NFR BLD AUTO: 8.2 % — SIGNIFICANT CHANGE UP (ref 2–14)
NEUTROPHILS # BLD AUTO: 9.05 K/UL — HIGH (ref 1.8–7.4)
NEUTROPHILS NFR BLD AUTO: 76.7 % — SIGNIFICANT CHANGE UP (ref 43–77)
NRBC BLD AUTO-RTO: 0 /100 WBCS — SIGNIFICANT CHANGE UP (ref 0–0)
PLATELET # BLD AUTO: 200 K/UL — SIGNIFICANT CHANGE UP (ref 150–400)
POTASSIUM SERPL-MCNC: 3.9 MMOL/L — SIGNIFICANT CHANGE UP (ref 3.5–5.3)
POTASSIUM SERPL-SCNC: 3.9 MMOL/L — SIGNIFICANT CHANGE UP (ref 3.5–5.3)
PROT SERPL-MCNC: 7 G/DL — SIGNIFICANT CHANGE UP (ref 6–8.3)
RBC # BLD: 3.88 M/UL — SIGNIFICANT CHANGE UP (ref 3.8–5.2)
RBC # FLD: 14 % — SIGNIFICANT CHANGE UP (ref 10.3–14.5)
SODIUM SERPL-SCNC: 140 MMOL/L — SIGNIFICANT CHANGE UP (ref 135–145)
WBC # BLD: 11.8 K/UL — HIGH (ref 3.8–10.5)
WBC # FLD AUTO: 11.8 K/UL — HIGH (ref 3.8–10.5)

## 2025-08-31 PROCEDURE — 99232 SBSQ HOSP IP/OBS MODERATE 35: CPT

## 2025-08-31 RX ADMIN — ROSUVASTATIN CALCIUM 5 MILLIGRAM(S): 20 TABLET, FILM COATED ORAL at 22:31

## 2025-08-31 RX ADMIN — TAMSULOSIN HYDROCHLORIDE 0.4 MILLIGRAM(S): 0.4 CAPSULE ORAL at 12:25

## 2025-08-31 RX ADMIN — DEXTROMETHORPHAN HBR, GUAIFENESIN 600 MILLIGRAM(S): 200 LIQUID ORAL at 17:40

## 2025-08-31 RX ADMIN — HEPARIN SODIUM 5000 UNIT(S): 1000 INJECTION INTRAVENOUS; SUBCUTANEOUS at 13:31

## 2025-08-31 RX ADMIN — DEXTROMETHORPHAN HBR, GUAIFENESIN 600 MILLIGRAM(S): 200 LIQUID ORAL at 05:52

## 2025-08-31 RX ADMIN — ERTAPENEM SODIUM 100 MILLIGRAM(S): 1 INJECTION, POWDER, LYOPHILIZED, FOR SOLUTION INTRAMUSCULAR; INTRAVENOUS at 12:25

## 2025-08-31 RX ADMIN — MUPIROCIN CALCIUM 1 APPLICATION(S): 20 CREAM TOPICAL at 05:52

## 2025-08-31 RX ADMIN — HEPARIN SODIUM 5000 UNIT(S): 1000 INJECTION INTRAVENOUS; SUBCUTANEOUS at 05:52

## 2025-08-31 RX ADMIN — HEPARIN SODIUM 5000 UNIT(S): 1000 INJECTION INTRAVENOUS; SUBCUTANEOUS at 22:31

## 2025-08-31 RX ADMIN — MUPIROCIN CALCIUM 1 APPLICATION(S): 20 CREAM TOPICAL at 17:40

## 2025-09-01 ENCOUNTER — TRANSCRIPTION ENCOUNTER (OUTPATIENT)
Age: 70
End: 2025-09-01

## 2025-09-01 LAB
ANION GAP SERPL CALC-SCNC: 7 MMOL/L — SIGNIFICANT CHANGE UP (ref 5–17)
BUN SERPL-MCNC: 24 MG/DL — HIGH (ref 7–18)
CALCIUM SERPL-MCNC: 8.5 MG/DL — SIGNIFICANT CHANGE UP (ref 8.4–10.5)
CHLORIDE SERPL-SCNC: 107 MMOL/L — SIGNIFICANT CHANGE UP (ref 96–108)
CO2 SERPL-SCNC: 25 MMOL/L — SIGNIFICANT CHANGE UP (ref 22–31)
CREAT SERPL-MCNC: 1.48 MG/DL — HIGH (ref 0.5–1.3)
EGFR: 38 ML/MIN/1.73M2 — LOW
EGFR: 38 ML/MIN/1.73M2 — LOW
GLUCOSE SERPL-MCNC: 97 MG/DL — SIGNIFICANT CHANGE UP (ref 70–99)
HCT VFR BLD CALC: 31.2 % — LOW (ref 34.5–45)
HGB BLD-MCNC: 10.3 G/DL — LOW (ref 11.5–15.5)
MCHC RBC-ENTMCNC: 30.6 PG — SIGNIFICANT CHANGE UP (ref 27–34)
MCHC RBC-ENTMCNC: 33 G/DL — SIGNIFICANT CHANGE UP (ref 32–36)
MCV RBC AUTO: 92.6 FL — SIGNIFICANT CHANGE UP (ref 80–100)
NRBC BLD AUTO-RTO: 0 /100 WBCS — SIGNIFICANT CHANGE UP (ref 0–0)
PLATELET # BLD AUTO: 197 K/UL — SIGNIFICANT CHANGE UP (ref 150–400)
POTASSIUM SERPL-MCNC: 3.6 MMOL/L — SIGNIFICANT CHANGE UP (ref 3.5–5.3)
POTASSIUM SERPL-SCNC: 3.6 MMOL/L — SIGNIFICANT CHANGE UP (ref 3.5–5.3)
RBC # BLD: 3.37 M/UL — LOW (ref 3.8–5.2)
RBC # FLD: 13.9 % — SIGNIFICANT CHANGE UP (ref 10.3–14.5)
SODIUM SERPL-SCNC: 139 MMOL/L — SIGNIFICANT CHANGE UP (ref 135–145)
WBC # BLD: 11.65 K/UL — HIGH (ref 3.8–10.5)
WBC # FLD AUTO: 11.65 K/UL — HIGH (ref 3.8–10.5)

## 2025-09-01 PROCEDURE — 99232 SBSQ HOSP IP/OBS MODERATE 35: CPT

## 2025-09-01 RX ORDER — ERTAPENEM SODIUM 1 G/1
500 INJECTION, POWDER, LYOPHILIZED, FOR SOLUTION INTRAMUSCULAR; INTRAVENOUS ONCE
Refills: 0 | Status: COMPLETED | OUTPATIENT
Start: 2025-09-01 | End: 2025-09-01

## 2025-09-01 RX ORDER — ERTAPENEM SODIUM 1 G/1
1000 INJECTION, POWDER, LYOPHILIZED, FOR SOLUTION INTRAMUSCULAR; INTRAVENOUS EVERY 24 HOURS
Refills: 0 | Status: DISCONTINUED | OUTPATIENT
Start: 2025-09-02 | End: 2025-09-03

## 2025-09-01 RX ADMIN — HEPARIN SODIUM 5000 UNIT(S): 1000 INJECTION INTRAVENOUS; SUBCUTANEOUS at 15:49

## 2025-09-01 RX ADMIN — HEPARIN SODIUM 5000 UNIT(S): 1000 INJECTION INTRAVENOUS; SUBCUTANEOUS at 22:03

## 2025-09-01 RX ADMIN — DEXTROMETHORPHAN HBR, GUAIFENESIN 600 MILLIGRAM(S): 200 LIQUID ORAL at 06:22

## 2025-09-01 RX ADMIN — HEPARIN SODIUM 5000 UNIT(S): 1000 INJECTION INTRAVENOUS; SUBCUTANEOUS at 06:22

## 2025-09-01 RX ADMIN — MUPIROCIN CALCIUM 1 APPLICATION(S): 20 CREAM TOPICAL at 17:07

## 2025-09-01 RX ADMIN — TAMSULOSIN HYDROCHLORIDE 0.4 MILLIGRAM(S): 0.4 CAPSULE ORAL at 11:59

## 2025-09-01 RX ADMIN — ERTAPENEM SODIUM 100 MILLIGRAM(S): 1 INJECTION, POWDER, LYOPHILIZED, FOR SOLUTION INTRAMUSCULAR; INTRAVENOUS at 12:00

## 2025-09-01 RX ADMIN — Medication 2 TABLET(S): at 22:03

## 2025-09-01 RX ADMIN — DEXTROMETHORPHAN HBR, GUAIFENESIN 600 MILLIGRAM(S): 200 LIQUID ORAL at 17:07

## 2025-09-01 RX ADMIN — MUPIROCIN CALCIUM 1 APPLICATION(S): 20 CREAM TOPICAL at 06:23

## 2025-09-01 RX ADMIN — ROSUVASTATIN CALCIUM 5 MILLIGRAM(S): 20 TABLET, FILM COATED ORAL at 22:02

## 2025-09-01 RX ADMIN — ERTAPENEM SODIUM 100 MILLIGRAM(S): 1 INJECTION, POWDER, LYOPHILIZED, FOR SOLUTION INTRAMUSCULAR; INTRAVENOUS at 18:26

## 2025-09-02 PROCEDURE — 85027 COMPLETE CBC AUTOMATED: CPT

## 2025-09-02 PROCEDURE — 82330 ASSAY OF CALCIUM: CPT

## 2025-09-02 PROCEDURE — 87077 CULTURE AEROBIC IDENTIFY: CPT

## 2025-09-02 PROCEDURE — 82553 CREATINE MB FRACTION: CPT

## 2025-09-02 PROCEDURE — 86900 BLOOD TYPING SEROLOGIC ABO: CPT

## 2025-09-02 PROCEDURE — 85025 COMPLETE CBC W/AUTO DIFF WBC: CPT

## 2025-09-02 PROCEDURE — 80061 LIPID PANEL: CPT

## 2025-09-02 PROCEDURE — 84100 ASSAY OF PHOSPHORUS: CPT

## 2025-09-02 PROCEDURE — 83735 ASSAY OF MAGNESIUM: CPT

## 2025-09-02 PROCEDURE — C2617: CPT

## 2025-09-02 PROCEDURE — 83880 ASSAY OF NATRIURETIC PEPTIDE: CPT

## 2025-09-02 PROCEDURE — 97162 PT EVAL MOD COMPLEX 30 MIN: CPT

## 2025-09-02 PROCEDURE — 82947 ASSAY GLUCOSE BLOOD QUANT: CPT

## 2025-09-02 PROCEDURE — 82550 ASSAY OF CK (CPK): CPT

## 2025-09-02 PROCEDURE — 87040 BLOOD CULTURE FOR BACTERIA: CPT

## 2025-09-02 PROCEDURE — 87186 SC STD MICRODIL/AGAR DIL: CPT

## 2025-09-02 PROCEDURE — 80053 COMPREHEN METABOLIC PANEL: CPT

## 2025-09-02 PROCEDURE — 36415 COLL VENOUS BLD VENIPUNCTURE: CPT

## 2025-09-02 PROCEDURE — 74177 CT ABD & PELVIS W/CONTRAST: CPT

## 2025-09-02 PROCEDURE — 87637 SARSCOV2&INF A&B&RSV AMP PRB: CPT

## 2025-09-02 PROCEDURE — 87641 MR-STAPH DNA AMP PROBE: CPT

## 2025-09-02 PROCEDURE — 86901 BLOOD TYPING SEROLOGIC RH(D): CPT

## 2025-09-02 PROCEDURE — 84132 ASSAY OF SERUM POTASSIUM: CPT

## 2025-09-02 PROCEDURE — 84702 CHORIONIC GONADOTROPIN TEST: CPT

## 2025-09-02 PROCEDURE — C8929: CPT

## 2025-09-02 PROCEDURE — 87086 URINE CULTURE/COLONY COUNT: CPT

## 2025-09-02 PROCEDURE — 93005 ELECTROCARDIOGRAM TRACING: CPT

## 2025-09-02 PROCEDURE — 83690 ASSAY OF LIPASE: CPT

## 2025-09-02 PROCEDURE — 84484 ASSAY OF TROPONIN QUANT: CPT

## 2025-09-02 PROCEDURE — 87640 STAPH A DNA AMP PROBE: CPT

## 2025-09-02 PROCEDURE — 76000 FLUOROSCOPY <1 HR PHYS/QHP: CPT

## 2025-09-02 PROCEDURE — 82803 BLOOD GASES ANY COMBINATION: CPT

## 2025-09-02 PROCEDURE — 84443 ASSAY THYROID STIM HORMONE: CPT

## 2025-09-02 PROCEDURE — 87150 DNA/RNA AMPLIFIED PROBE: CPT

## 2025-09-02 PROCEDURE — 81001 URINALYSIS AUTO W/SCOPE: CPT

## 2025-09-02 PROCEDURE — 83036 HEMOGLOBIN GLYCOSYLATED A1C: CPT

## 2025-09-02 PROCEDURE — 84295 ASSAY OF SERUM SODIUM: CPT

## 2025-09-02 PROCEDURE — 86850 RBC ANTIBODY SCREEN: CPT

## 2025-09-02 PROCEDURE — 80048 BASIC METABOLIC PNL TOTAL CA: CPT

## 2025-09-02 PROCEDURE — 83605 ASSAY OF LACTIC ACID: CPT

## 2025-09-02 PROCEDURE — 71045 X-RAY EXAM CHEST 1 VIEW: CPT

## 2025-09-02 RX ORDER — SENNA 187 MG
2 TABLET ORAL
Qty: 60 | Refills: 0
Start: 2025-09-02 | End: 2025-10-01

## 2025-09-02 RX ORDER — SULFAMETHOXAZOLE/TRIMETHOPRIM 800-160 MG
1 TABLET ORAL
Qty: 20 | Refills: 0
Start: 2025-09-02 | End: 2025-09-11

## 2025-09-02 RX ADMIN — HEPARIN SODIUM 5000 UNIT(S): 1000 INJECTION INTRAVENOUS; SUBCUTANEOUS at 06:05

## 2025-09-02 RX ADMIN — TAMSULOSIN HYDROCHLORIDE 0.4 MILLIGRAM(S): 0.4 CAPSULE ORAL at 11:35

## 2025-09-02 RX ADMIN — Medication 2 TABLET(S): at 21:35

## 2025-09-02 RX ADMIN — HEPARIN SODIUM 5000 UNIT(S): 1000 INJECTION INTRAVENOUS; SUBCUTANEOUS at 21:37

## 2025-09-02 RX ADMIN — ROSUVASTATIN CALCIUM 5 MILLIGRAM(S): 20 TABLET, FILM COATED ORAL at 21:35

## 2025-09-02 RX ADMIN — HEPARIN SODIUM 5000 UNIT(S): 1000 INJECTION INTRAVENOUS; SUBCUTANEOUS at 13:15

## 2025-09-02 RX ADMIN — MUPIROCIN CALCIUM 1 APPLICATION(S): 20 CREAM TOPICAL at 06:05

## 2025-09-02 RX ADMIN — ERTAPENEM SODIUM 100 MILLIGRAM(S): 1 INJECTION, POWDER, LYOPHILIZED, FOR SOLUTION INTRAMUSCULAR; INTRAVENOUS at 09:06

## 2025-09-02 RX ADMIN — DEXTROMETHORPHAN HBR, GUAIFENESIN 600 MILLIGRAM(S): 200 LIQUID ORAL at 06:04

## 2025-09-03 ENCOUNTER — TRANSCRIPTION ENCOUNTER (OUTPATIENT)
Age: 70
End: 2025-09-03

## 2025-09-03 VITALS
RESPIRATION RATE: 18 BRPM | DIASTOLIC BLOOD PRESSURE: 75 MMHG | SYSTOLIC BLOOD PRESSURE: 118 MMHG | OXYGEN SATURATION: 95 % | TEMPERATURE: 98 F | HEART RATE: 91 BPM

## 2025-09-03 LAB
ALBUMIN SERPL ELPH-MCNC: 2.2 G/DL — LOW (ref 3.5–5)
ALP SERPL-CCNC: 206 U/L — HIGH (ref 40–120)
ALT FLD-CCNC: 16 U/L DA — SIGNIFICANT CHANGE UP (ref 10–60)
ANION GAP SERPL CALC-SCNC: 4 MMOL/L — LOW (ref 5–17)
AST SERPL-CCNC: 15 U/L — SIGNIFICANT CHANGE UP (ref 10–40)
BILIRUB SERPL-MCNC: 0.6 MG/DL — SIGNIFICANT CHANGE UP (ref 0.2–1.2)
BUN SERPL-MCNC: 19 MG/DL — HIGH (ref 7–18)
CALCIUM SERPL-MCNC: 8.7 MG/DL — SIGNIFICANT CHANGE UP (ref 8.4–10.5)
CHLORIDE SERPL-SCNC: 108 MMOL/L — SIGNIFICANT CHANGE UP (ref 96–108)
CO2 SERPL-SCNC: 28 MMOL/L — SIGNIFICANT CHANGE UP (ref 22–31)
CREAT SERPL-MCNC: 1.36 MG/DL — HIGH (ref 0.5–1.3)
CULTURE RESULTS: SIGNIFICANT CHANGE UP
CULTURE RESULTS: SIGNIFICANT CHANGE UP
EGFR: 42 ML/MIN/1.73M2 — LOW
EGFR: 42 ML/MIN/1.73M2 — LOW
GLUCOSE SERPL-MCNC: 92 MG/DL — SIGNIFICANT CHANGE UP (ref 70–99)
HCT VFR BLD CALC: 31.4 % — LOW (ref 34.5–45)
HGB BLD-MCNC: 10.5 G/DL — LOW (ref 11.5–15.5)
MAGNESIUM SERPL-MCNC: 1.7 MG/DL — SIGNIFICANT CHANGE UP (ref 1.6–2.6)
MCHC RBC-ENTMCNC: 30.6 PG — SIGNIFICANT CHANGE UP (ref 27–34)
MCHC RBC-ENTMCNC: 33.4 G/DL — SIGNIFICANT CHANGE UP (ref 32–36)
MCV RBC AUTO: 91.5 FL — SIGNIFICANT CHANGE UP (ref 80–100)
NRBC BLD AUTO-RTO: 0 /100 WBCS — SIGNIFICANT CHANGE UP (ref 0–0)
PHOSPHATE SERPL-MCNC: 3.1 MG/DL — SIGNIFICANT CHANGE UP (ref 2.5–4.5)
PLATELET # BLD AUTO: 244 K/UL — SIGNIFICANT CHANGE UP (ref 150–400)
POTASSIUM SERPL-MCNC: 3.8 MMOL/L — SIGNIFICANT CHANGE UP (ref 3.5–5.3)
POTASSIUM SERPL-SCNC: 3.8 MMOL/L — SIGNIFICANT CHANGE UP (ref 3.5–5.3)
PROT SERPL-MCNC: 6.6 G/DL — SIGNIFICANT CHANGE UP (ref 6–8.3)
RBC # BLD: 3.43 M/UL — LOW (ref 3.8–5.2)
RBC # FLD: 14 % — SIGNIFICANT CHANGE UP (ref 10.3–14.5)
SODIUM SERPL-SCNC: 140 MMOL/L — SIGNIFICANT CHANGE UP (ref 135–145)
SPECIMEN SOURCE: SIGNIFICANT CHANGE UP
SPECIMEN SOURCE: SIGNIFICANT CHANGE UP
WBC # BLD: 11.78 K/UL — HIGH (ref 3.8–10.5)
WBC # FLD AUTO: 11.78 K/UL — HIGH (ref 3.8–10.5)

## 2025-09-03 PROCEDURE — 83690 ASSAY OF LIPASE: CPT

## 2025-09-03 PROCEDURE — 81001 URINALYSIS AUTO W/SCOPE: CPT

## 2025-09-03 PROCEDURE — 87186 SC STD MICRODIL/AGAR DIL: CPT

## 2025-09-03 PROCEDURE — 87086 URINE CULTURE/COLONY COUNT: CPT

## 2025-09-03 PROCEDURE — 85025 COMPLETE CBC W/AUTO DIFF WBC: CPT

## 2025-09-03 PROCEDURE — 82550 ASSAY OF CK (CPK): CPT

## 2025-09-03 PROCEDURE — 84484 ASSAY OF TROPONIN QUANT: CPT

## 2025-09-03 PROCEDURE — 82330 ASSAY OF CALCIUM: CPT

## 2025-09-03 PROCEDURE — 86850 RBC ANTIBODY SCREEN: CPT

## 2025-09-03 PROCEDURE — 93005 ELECTROCARDIOGRAM TRACING: CPT

## 2025-09-03 PROCEDURE — 99239 HOSP IP/OBS DSCHRG MGMT >30: CPT

## 2025-09-03 PROCEDURE — 87077 CULTURE AEROBIC IDENTIFY: CPT

## 2025-09-03 PROCEDURE — 83880 ASSAY OF NATRIURETIC PEPTIDE: CPT

## 2025-09-03 PROCEDURE — 87150 DNA/RNA AMPLIFIED PROBE: CPT

## 2025-09-03 PROCEDURE — 82947 ASSAY GLUCOSE BLOOD QUANT: CPT

## 2025-09-03 PROCEDURE — 84295 ASSAY OF SERUM SODIUM: CPT

## 2025-09-03 PROCEDURE — C8929: CPT

## 2025-09-03 PROCEDURE — 80053 COMPREHEN METABOLIC PANEL: CPT

## 2025-09-03 PROCEDURE — 97162 PT EVAL MOD COMPLEX 30 MIN: CPT

## 2025-09-03 PROCEDURE — 83605 ASSAY OF LACTIC ACID: CPT

## 2025-09-03 PROCEDURE — 82803 BLOOD GASES ANY COMBINATION: CPT

## 2025-09-03 PROCEDURE — 84132 ASSAY OF SERUM POTASSIUM: CPT

## 2025-09-03 PROCEDURE — 87640 STAPH A DNA AMP PROBE: CPT

## 2025-09-03 PROCEDURE — 76000 FLUOROSCOPY <1 HR PHYS/QHP: CPT

## 2025-09-03 PROCEDURE — C2617: CPT

## 2025-09-03 PROCEDURE — 87040 BLOOD CULTURE FOR BACTERIA: CPT

## 2025-09-03 PROCEDURE — 87641 MR-STAPH DNA AMP PROBE: CPT

## 2025-09-03 PROCEDURE — 83735 ASSAY OF MAGNESIUM: CPT

## 2025-09-03 PROCEDURE — 80061 LIPID PANEL: CPT

## 2025-09-03 PROCEDURE — 86900 BLOOD TYPING SEROLOGIC ABO: CPT

## 2025-09-03 PROCEDURE — 71045 X-RAY EXAM CHEST 1 VIEW: CPT

## 2025-09-03 PROCEDURE — 99285 EMERGENCY DEPT VISIT HI MDM: CPT | Mod: 25

## 2025-09-03 PROCEDURE — 85027 COMPLETE CBC AUTOMATED: CPT

## 2025-09-03 PROCEDURE — 80048 BASIC METABOLIC PNL TOTAL CA: CPT

## 2025-09-03 PROCEDURE — 96374 THER/PROPH/DIAG INJ IV PUSH: CPT

## 2025-09-03 PROCEDURE — 36415 COLL VENOUS BLD VENIPUNCTURE: CPT

## 2025-09-03 PROCEDURE — 96375 TX/PRO/DX INJ NEW DRUG ADDON: CPT

## 2025-09-03 PROCEDURE — 82553 CREATINE MB FRACTION: CPT

## 2025-09-03 PROCEDURE — 87637 SARSCOV2&INF A&B&RSV AMP PRB: CPT

## 2025-09-03 PROCEDURE — 83036 HEMOGLOBIN GLYCOSYLATED A1C: CPT

## 2025-09-03 PROCEDURE — 84702 CHORIONIC GONADOTROPIN TEST: CPT

## 2025-09-03 PROCEDURE — 74177 CT ABD & PELVIS W/CONTRAST: CPT

## 2025-09-03 PROCEDURE — 86901 BLOOD TYPING SEROLOGIC RH(D): CPT

## 2025-09-03 PROCEDURE — 84443 ASSAY THYROID STIM HORMONE: CPT

## 2025-09-03 PROCEDURE — 84100 ASSAY OF PHOSPHORUS: CPT

## 2025-09-03 PROCEDURE — 96376 TX/PRO/DX INJ SAME DRUG ADON: CPT

## 2025-09-03 RX ADMIN — ERTAPENEM SODIUM 100 MILLIGRAM(S): 1 INJECTION, POWDER, LYOPHILIZED, FOR SOLUTION INTRAMUSCULAR; INTRAVENOUS at 08:14

## 2025-09-03 RX ADMIN — HEPARIN SODIUM 5000 UNIT(S): 1000 INJECTION INTRAVENOUS; SUBCUTANEOUS at 05:44

## 2025-09-03 RX ADMIN — Medication 10 MILLIGRAM(S): at 09:12

## 2025-09-03 RX ADMIN — TAMSULOSIN HYDROCHLORIDE 0.4 MILLIGRAM(S): 0.4 CAPSULE ORAL at 11:31

## 2025-09-05 PROBLEM — Z00.00 ENCOUNTER FOR PREVENTIVE HEALTH EXAMINATION: Status: ACTIVE | Noted: 2025-09-05

## 2025-09-09 ENCOUNTER — NON-APPOINTMENT (OUTPATIENT)
Age: 70
End: 2025-09-09

## 2025-09-11 ENCOUNTER — APPOINTMENT (OUTPATIENT)
Age: 70
End: 2025-09-11

## 2025-09-18 ENCOUNTER — APPOINTMENT (OUTPATIENT)
Dept: UROLOGY | Facility: CLINIC | Age: 70
End: 2025-09-18
Payer: SELF-PAY

## 2025-09-18 VITALS
HEART RATE: 103 BPM | BODY MASS INDEX: 43.04 KG/M2 | SYSTOLIC BLOOD PRESSURE: 113 MMHG | DIASTOLIC BLOOD PRESSURE: 78 MMHG | TEMPERATURE: 97.9 F | HEIGHT: 68 IN | OXYGEN SATURATION: 98 % | WEIGHT: 284 LBS

## 2025-09-18 DIAGNOSIS — N20.0 CALCULUS OF KIDNEY: ICD-10-CM

## 2025-09-18 DIAGNOSIS — Z78.9 OTHER SPECIFIED HEALTH STATUS: ICD-10-CM

## 2025-09-18 DIAGNOSIS — Z86.79 PERSONAL HISTORY OF OTHER DISEASES OF THE CIRCULATORY SYSTEM: ICD-10-CM

## 2025-09-18 DIAGNOSIS — Z86.39 PERSONAL HISTORY OF OTHER ENDOCRINE, NUTRITIONAL AND METABOLIC DISEASE: ICD-10-CM

## 2025-09-18 PROCEDURE — 99214 OFFICE O/P EST MOD 30 MIN: CPT

## 2025-09-18 PROCEDURE — G2211 COMPLEX E/M VISIT ADD ON: CPT

## 2025-09-18 RX ORDER — DAPAGLIFLOZIN 10 MG/1
TABLET, FILM COATED ORAL
Refills: 0 | Status: ACTIVE | COMMUNITY

## 2025-09-18 RX ORDER — ROSUVASTATIN CALCIUM 5 MG/1
5 TABLET, FILM COATED ORAL
Refills: 0 | Status: ACTIVE | COMMUNITY

## 2025-09-18 RX ORDER — SPIRONOLACTONE 25 MG/1
25 TABLET ORAL
Refills: 0 | Status: ACTIVE | COMMUNITY

## 2025-09-18 RX ORDER — LOSARTAN POTASSIUM 50 MG/1
50 TABLET, FILM COATED ORAL
Refills: 0 | Status: ACTIVE | COMMUNITY

## 2025-09-19 LAB
APPEARANCE: ABNORMAL
BACTERIA: NEGATIVE /HPF
BILIRUBIN URINE: NEGATIVE
BLOOD URINE: ABNORMAL
CAST: 3 /LPF
COLOR: YELLOW
EPITHELIAL CELLS: 3 /HPF
GLUCOSE QUALITATIVE U: NEGATIVE MG/DL
KETONES URINE: NEGATIVE MG/DL
LEUKOCYTE ESTERASE URINE: ABNORMAL
MICROSCOPIC-UA: NORMAL
NITRITE URINE: NEGATIVE
PH URINE: 6.5
PROTEIN URINE: 100 MG/DL
RED BLOOD CELLS URINE: 56 /HPF
SPECIFIC GRAVITY URINE: 1.01
UROBILINOGEN URINE: 0.2 MG/DL
WHITE BLOOD CELLS URINE: 378 /HPF

## 2025-09-21 LAB — BACTERIA UR CULT: NORMAL

## (undated) DEVICE — SOL IRR POUR H2O 500ML

## (undated) DEVICE — GOWN XL

## (undated) DEVICE — DRAPE C ARM UNIVERSAL

## (undated) DEVICE — WARMING BLANKET UPPER ADULT

## (undated) DEVICE — SOL IRR POUR NS 0.9% 500ML

## (undated) DEVICE — SYR LUER LOK 20CC

## (undated) DEVICE — SOL IRR BAG NS 0.9% 3000ML

## (undated) DEVICE — VENODYNE/SCD SLEEVE CALF MEDIUM

## (undated) DEVICE — Device

## (undated) DEVICE — SYR LUER LOK 10CC

## (undated) DEVICE — PACK CYSTO